# Patient Record
Sex: FEMALE | Race: WHITE | HISPANIC OR LATINO | Employment: FULL TIME | URBAN - METROPOLITAN AREA
[De-identification: names, ages, dates, MRNs, and addresses within clinical notes are randomized per-mention and may not be internally consistent; named-entity substitution may affect disease eponyms.]

---

## 2024-02-27 PROCEDURE — 87661 TRICHOMONAS VAGINALIS AMPLIF: CPT | Performed by: STUDENT IN AN ORGANIZED HEALTH CARE EDUCATION/TRAINING PROGRAM

## 2024-02-27 PROCEDURE — 87086 URINE CULTURE/COLONY COUNT: CPT | Performed by: STUDENT IN AN ORGANIZED HEALTH CARE EDUCATION/TRAINING PROGRAM

## 2024-02-27 PROCEDURE — 87491 CHLMYD TRACH DNA AMP PROBE: CPT | Performed by: STUDENT IN AN ORGANIZED HEALTH CARE EDUCATION/TRAINING PROGRAM

## 2024-03-19 DIAGNOSIS — O09.291: Primary | ICD-10-CM

## 2024-03-19 DIAGNOSIS — O09.291 HX OF PRE-ECLAMPSIA IN PRIOR PREGNANCY, CURRENTLY PREGNANT, FIRST TRIMESTER: ICD-10-CM

## 2024-03-21 ENCOUNTER — OFFICE VISIT (OUTPATIENT)
Dept: MATERNAL FETAL MEDICINE | Facility: CLINIC | Age: 29
End: 2024-03-21
Payer: COMMERCIAL

## 2024-03-21 ENCOUNTER — PROCEDURE VISIT (OUTPATIENT)
Dept: MATERNAL FETAL MEDICINE | Facility: CLINIC | Age: 29
End: 2024-03-21
Payer: COMMERCIAL

## 2024-03-21 VITALS
HEIGHT: 62 IN | WEIGHT: 246.5 LBS | SYSTOLIC BLOOD PRESSURE: 118 MMHG | HEART RATE: 78 BPM | BODY MASS INDEX: 45.36 KG/M2 | DIASTOLIC BLOOD PRESSURE: 58 MMHG

## 2024-03-21 DIAGNOSIS — E66.01 SEVERE OBESITY DUE TO EXCESS CALORIES AFFECTING PREGNANCY IN FIRST TRIMESTER: ICD-10-CM

## 2024-03-21 DIAGNOSIS — O10.919 HTN IN PREGNANCY, CHRONIC: Primary | ICD-10-CM

## 2024-03-21 DIAGNOSIS — E04.9 THYROID GOITER: ICD-10-CM

## 2024-03-21 DIAGNOSIS — O99.341 MENTAL DISORDER AFFECTING PREGNANCY IN FIRST TRIMESTER: ICD-10-CM

## 2024-03-21 DIAGNOSIS — O09.299 HISTORY OF ANENCEPHALY IN PRIOR PREGNANCY, CURRENTLY PREGNANT: ICD-10-CM

## 2024-03-21 DIAGNOSIS — O22.31 DEEP VEIN THROMBOSIS (DVT) AFFECTING PREGNANCY IN FIRST TRIMESTER: ICD-10-CM

## 2024-03-21 DIAGNOSIS — O99.211 SEVERE OBESITY DUE TO EXCESS CALORIES AFFECTING PREGNANCY IN FIRST TRIMESTER: ICD-10-CM

## 2024-03-21 DIAGNOSIS — O09.291 HX OF PRE-ECLAMPSIA IN PRIOR PREGNANCY, CURRENTLY PREGNANT, FIRST TRIMESTER: ICD-10-CM

## 2024-03-21 DIAGNOSIS — Z87.898 HISTORY OF PREDIABETES: ICD-10-CM

## 2024-03-21 DIAGNOSIS — O09.291: ICD-10-CM

## 2024-03-21 PROCEDURE — 3074F SYST BP LT 130 MM HG: CPT | Mod: CPTII,S$GLB,, | Performed by: OBSTETRICS & GYNECOLOGY

## 2024-03-21 PROCEDURE — 3044F HG A1C LEVEL LT 7.0%: CPT | Mod: CPTII,S$GLB,, | Performed by: OBSTETRICS & GYNECOLOGY

## 2024-03-21 PROCEDURE — 1160F RVW MEDS BY RX/DR IN RCRD: CPT | Mod: CPTII,S$GLB,, | Performed by: OBSTETRICS & GYNECOLOGY

## 2024-03-21 PROCEDURE — 76801 OB US < 14 WKS SINGLE FETUS: CPT | Mod: S$GLB,,, | Performed by: OBSTETRICS & GYNECOLOGY

## 2024-03-21 PROCEDURE — 3008F BODY MASS INDEX DOCD: CPT | Mod: CPTII,S$GLB,, | Performed by: OBSTETRICS & GYNECOLOGY

## 2024-03-21 PROCEDURE — 99205 OFFICE O/P NEW HI 60 MIN: CPT | Mod: S$GLB,,, | Performed by: OBSTETRICS & GYNECOLOGY

## 2024-03-21 PROCEDURE — 1159F MED LIST DOCD IN RCRD: CPT | Mod: CPTII,S$GLB,, | Performed by: OBSTETRICS & GYNECOLOGY

## 2024-03-21 PROCEDURE — 3078F DIAST BP <80 MM HG: CPT | Mod: CPTII,S$GLB,, | Performed by: OBSTETRICS & GYNECOLOGY

## 2024-03-21 RX ORDER — CHOLECALCIFEROL (VITAMIN D3) 50 MCG
TABLET ORAL DAILY
COMMUNITY

## 2024-03-21 RX ORDER — HYDROXYZINE PAMOATE 50 MG/1
50 CAPSULE ORAL EVERY 6 HOURS PRN
COMMUNITY
End: 2024-03-21

## 2024-03-21 RX ORDER — TRIAMCINOLONE ACETONIDE 0.25 MG/G
1 CREAM TOPICAL 3 TIMES DAILY PRN
COMMUNITY
Start: 2023-12-22

## 2024-03-21 RX ORDER — ALBUTEROL SULFATE 90 UG/1
2 AEROSOL, METERED RESPIRATORY (INHALATION) EVERY 6 HOURS PRN
COMMUNITY
Start: 2024-03-12

## 2024-03-21 RX ORDER — FLUOROMETHOLONE 1 MG/ML
1 SUSPENSION/ DROPS OPHTHALMIC 4 TIMES DAILY PRN
COMMUNITY
Start: 2024-03-03

## 2024-03-21 NOTE — ASSESSMENT & PLAN NOTE
There are  risks associated with obesity which include and increased risk of hypertension, preeclampsia, gestational diabetes, venous thromboembolic disease,  delivery, macrosomia (with resultant shoulder dystocia, obstetric sphincter injury), IUFD, longer first stage of labor, decreased TOLAC success rate, emergent & scheduled  & complications of  (prolonged OR time, delayed delivery, excessive EBL, infection, wound separation/infection, higher spinal failure rate, more difficult intubation).  Obesity is also associated with fetal anomalies, specifically neural tube defects.  Studies have shown that the rate of complication increases with rising BMI and thus pregnancies with maternal morbid obesity are at increased risk.      BMI 45    Recommendations:  TWG goal is 11-20 lbs  Screen for signs/symptoms of obstructive sleep apnea  Nutritionist consult offered (this is to be ordered by primary OB provider)  Consider early 1 hr GCT (between 14-16 weeks gestation); repeat at 24-28 weeks gestation  Start low dose aspirin daily at 12 weeks for preeclampsia risk reduction  Targeted anatomical survey scheduled at 18-20 weeks  Fetal growth ultrasound at 32 weeks if BMI >= 40  Weekly  testing at 32 weeks if pre-pregnancy BMI >= 45  Lovenox VTE prophylaxis while admitted to the hospital (antepartum or postpartum) if BMI >= 40.   Encouraged breastfeeding  Postpartum lifestyle modifications & weight loss

## 2024-03-21 NOTE — ASSESSMENT & PLAN NOTE
She reports a history of anxiety and depression. She is taking Zoloft 50mg daily and Hydroxyzine PRN. She reports improvement of symptoms with the utilization of this medication regimen. Discussed increased risk for peripartum and postpartum mood disorders. Precautions provided.      We discussed the usage of SSRIs in pregnancy and the minimal risks associated with the fetus.      We have discussed the importance of optimization of mental health conditions during pregnancy in an effort to reduce the risk of postpartum mood disorders. We have discussed options for management including psychotherapy, counseling, cognitive behavioral therapy, exercise/yoga, journaling, and psychiatry services. She will notify our office if she is interested in being referred for any of the above.

## 2024-03-21 NOTE — ASSESSMENT & PLAN NOTE
Today I counseled the patient on maternal/fetal risks associated with CHTN during pregnancy. Risks include but not limited to fetal growth restriction, miscarriage, abruption, maternal end organ disease (renal failure, MI, and stroke),  delivery, development of superimposed preeclampsia, and eclampsia. She was counseled on the recommendations for blood pressure control, serial ultrasound for fetal growth assessment and  testing, and timing of delivery. I also counseled her on the recommendation for aspirin 81 mg daily which may decrease her risk of developing superimposed preeclampsia.     Of note, she has a history of pre-eclampsia with her first pregnancy. She also has tachycardia. She is currently taking Acebutolol 200mg once daily (this regimen was started during her last pregnancy). She should continue this therapy without interruption.     Recommendations (Please refer to [facility] guidelines):  -Consider reducing aspirin dosing from 162mg to 81mg once daily for preeclampsia prevention since she is also taking Lovenox 60mg once daily  -Continue current medications: Acebutolol 200mg once daily  -Baseline evaluation with primary OB:   24-hour urine protein or baseline P/C ratio, CMP, and CBC (will request results from 24h urine completed at [facility])  Maternal EKG  Maternal ophthalmic evaluation  Maternal echocardiogram if HTN has been long-standing or EKG is abnormal  -Serial fetal growth ultrasounds every 4-6 weeks, beginning at 26-28 weeks.   -Continued close observation of patient's blood pressures. Avoid hypotension as this has been associated with uteroplacental insufficiency.  -In conjunction with the CHAP study recommendation for BP control: If BP is persistently ?140/90 antihypertensive medication is recommended with goal BP of 120-140/80-90.  -Weekly antepartum testing at 32 weeks (NST+AFV); twice weekly testing if control is poor, multiple comorbidities are present, or requires several  medications for control   -Delivery timing:  No medications, no comorbid conditions: 39 0/7 - 39 6/7 weeks gestation  No medications, comorbid conditions: 38 0/7 - 38 6/7 weeks gestation  Controlled on single agent, no comorbid conditions: 38 0/7 - 38 6/7 weeks gestation  Controlled on single agent, comorbid conditions: 37 0/7 - 37 6/7 weeks gestation  Uncontrolled or requiring ? 2 medications: 36 0/7 - 37 6/7 weeks gestation    Comorbid conditions include BMI >= 40, diabetes, and complex medical condition associated with placental dysfunction (ie lupus or other vascular disease)  Delivery may be recommended earlier pending results of fetal growth ultrasounds, AFV assessment, or antepartum testing results.

## 2024-03-21 NOTE — ASSESSMENT & PLAN NOTE
With her most recent pregnancy, her baby was diagnosed with anencephaly antenatally. She, then, developed polyhydramnios and had a subsequent amnioreduction for symptomatic relief at 32 weeks. Following the procedure, she PPROM'ed and had NRFHTs. She had an elective primary  in an effort to hold her baby while it was still alive prior to passing.    The risk of spina bifida/ neural tube defect after a previous sibling is affected is estimated to be about 4%. Cranial vault appears normal today on ultrasound and we will continue to reassess.    We recommend 4mg of folic acid daily.

## 2024-03-21 NOTE — ASSESSMENT & PLAN NOTE
She has a history of prediabetes. She has been taking Metformin 500 BID prior to pregnancy but has discontinued this regimen. Her most recent A1C was 5.1    There is an increased risk of diabetes in pregnancy given this history. We recommend early glucose screening with repeat testing at 24 weeks if passes.

## 2024-03-21 NOTE — PROGRESS NOTES
MATERNAL-FETAL MEDICINE   CONSULT NOTE    Provider requesting consultation: Dr Sandoval    SUBJECTIVE:     Ms. Rachel Pires is a 28 y.o.  female with IUP at 10w2d who is seen in consultation by MFM for evaluation and management of:  Problem   3. BMI affecting pregnancy in first trimester   4. History of anencephaly in prior pregnancy, currently pregnant   5. History of prediabetes   6. Anxiety/depression affecting pregnancy in first trimester   7. Thyroid goiter   1. HTN in pregnancy, chronic   2. h/o PE complicating pregnancy     Rachel is being referred for a history of preeclampsia with her 1st pregnancy and subsequent development of chronic hypertension/tachycardia afterward.  She was started on Acebutolol 200 mg once daily in her prior pregnancy in his continue this regimen.  Her primary OB started her on low-dose aspirin (162 mg) daily.  She has completed baseline hypertension labs.  With her most recent pregnancy, her baby had anencephaly diagnosed at time of anatomy scan.  She developed severe polyhydramnios at 32 weeks which required amnio reduction for symptomatic relief.  Shortly thereafter, she PPROM'ed and her baby developed nonreassuring fetal heart tones.  She elected for a primary  in an effort to hold her baby while he was still alive.  He passed after delivery.  She is taking a prescription prenatal vitamin currently with no additional folic acid supplementation.  One-week postpartum after her last delivery, she developed severe chest pain and was diagnosed with a pulmonary embolism.  She was on Eliquis for 6 months.  She reports having a echocardiogram completed approximately 2 months after the PE with her cardiologist with normal findings.  She elected to be evaluated by a  due to her baby having anencephaly.  Her  ordered a thrombophilia workup due to recent PE and a prothrombin gene mutation (heterozygous) was discovered.  She was, then, referred to a  hematologist where she has been followed routinely since then.  She was most recently evaluated about a month and a half ago and was started on Lovenox 60 mg daily to be continued throughout her pregnancy.  She states her hematologist is planning on increasing to therapeutic dosing in the postpartum period due to limited mobility.   BMI 45, History of prediabetes. On Metformin in the past; now discontinued. A1C 5.1 last week  History of anxiety and depression. Taking Zoloft 50mg daily and Vistaril PRN with adequate relief.  Hx thyroid goiter. She was on levothyroxine in the past. No longer taking this medication. She is following with an endocrinologist. TFTs evaluated last week with normal TSH. Thyroid antibodies negative.    Today we discussed genetic screening and she desires NIPT.           Medication List with Changes/Refills   Current Medications    ACEBUTOLOL (SECTRAL) 200 MG CAPSULE    Take 200 mg by mouth 2 (two) times daily.    ALBUTEROL (PROVENTIL/VENTOLIN HFA) 90 MCG/ACTUATION INHALER    Inhale 2 puffs into the lungs every 6 (six) hours as needed.    ASCORBIC ACID, VITAMIN C, (VITAMIN C) 1000 MG TABLET    Take 1,000 mg by mouth once daily.    ASPIRIN (ECOTRIN) 81 MG EC TABLET    Take 2 tablets (162 mg total) by mouth once daily. Start at 12 weeks gestation.  At 36 weeks gestation, decrease to one 81mg tablet daily.    BUSPIRONE (BUSPAR) 15 MG TABLET    Take 15 mg by mouth 3 (three) times daily.    CALCIUM CARBONATE-VITAMIN D3 600 MG-10 MCG (400 UNIT) CAP    Take 50 mcg by mouth.    CHOLECALCIFEROL, VITAMIN D3, (VITAMIN D3) 50 MCG (2,000 UNIT) TAB    Take by mouth once daily.    ENOXAPARIN (LOVENOX) 60 MG/0.6 ML SYRG        FAMOTIDINE (PEPCID) 20 MG TABLET    Take 1 tablet (20 mg total) by mouth 2 (two) times daily.    FERROUS SULFATE (FEOSOL) 325 MG (65 MG IRON) TAB TABLET    Take by mouth 3 (three) times daily.    FLUOROMETHOLONE 0.1% (FML) 0.1 % DRPS    Apply 1 drop to eye 4 (four) times daily as  needed.    HYDROXYZINE (ATARAX) 50 MG TABLET    Take 50 mg by mouth 4 (four) times daily.    PNV NO.143-IRON-METHYLFOLATE ORAL    Take by mouth.    SERTRALINE (ZOLOFT) 50 MG TABLET    Take 50 mg by mouth once daily.    TRIAMCINOLONE ACETONIDE 0.025% (KENALOG) 0.025 % CREAM    Apply 1 g topically 3 (three) times daily as needed.    ZINC GLUCONATE 50 MG TABLET    Take 50 mg by mouth once daily.   Discontinued Medications    APIXABAN (ELIQUIS) 5 MG TAB    Eliquis 5 mg tablet   TAKE 1 TABLET BY MOUTH TWICE A DAY    ESCITALOPRAM OXALATE (LEXAPRO) 5 MG TAB    escitalopram 5 mg tablet   TAKE 1 TABLET BY MOUTH EVERY DAY    HYDROXYZINE PAMOATE (VISTARIL) 50 MG CAP    Take 50 mg by mouth every 6 (six) hours as needed.    METFORMIN (GLUCOPHAGE) 500 MG TABLET    Take 500 mg by mouth 2 (two) times daily with meals.    PNV NO.95/FERROUS FUM/FOLIC AC (PRENATAL ORAL)    Take by mouth.       Review of patient's allergies indicates:   Allergen Reactions    Latex Hives and Itching       PMH:  Past Medical History:   Diagnosis Date    Anemia, unspecified     Anxiety and depression     Blood clotting disorder     factor 2    Diabetic acetonemia     pre-diabetic    High blood pressure     Insomnia     Other pulmonary embolism without acute cor pulmonale     Sleep apnea, unspecified     Supraventricular tachycardia        PObHx:  OB History    Para Term  AB Living   3 2 1 1   1   SAB IAB Ectopic Multiple Live Births           2      # Outcome Date GA Lbr Martin/2nd Weight Sex Delivery Anes PTL Lv   3 Current            2  22 32w0d  1.106 kg (2 lb 7 oz) M CS-Unspec  Y DEC      Birth Comments: neural tube defect,  shortly after birth; had poly and underwent amnioreduction then PPROM'ed and delivered      Complications: Anencephaly   1 Term 13 38w0d  3.005 kg (6 lb 10 oz) F Vag-Spont  N GISEL      Complications: Pre-eclampsia       PSH:  Past Surgical History:   Procedure Laterality Date     SECTION   "2022    TYMPANOSTOMY TUBE PLACEMENT      WISDOM TOOTH EXTRACTION         Family history:family history includes Breast cancer in her mother; Clotting disorder in her paternal aunt and paternal uncle; Diabetes in her paternal uncle; Hypertension in her father and mother; Skin cancer in her father; Stroke in her father.    Social history: reports that she quit smoking about 2 months ago. Her smoking use included cigarettes. She has been exposed to tobacco smoke. She has quit using smokeless tobacco. She reports that she does not currently use alcohol. She reports that she does not use drugs.    Genetic history: Prev pregnancy: anencephaly. The patient denies any inherited genetic diseases or birth defects in herself or her partner's personal history or family.    Objective:   BP (!) 118/58 (BP Location: Right arm)   Pulse 78   Ht 5' 2" (1.575 m)   Wt 111.8 kg (246 lb 7.6 oz)   LMP 2024 (Exact Date)   BMI 45.08 kg/m²     Ultrasound performed. See viewpoint for full ultrasound report.    A viable jimenez intrauterine pregnancy is visualized consistent with the given JAMISON.  Yolk sac is present. Early anatomic survey reveals no abnormalities. Placental location is not yet identified.    She will return for NIPT draw next Tuesday as the  will not be picking up today/tomorrow.    ASSESSMENT/PLAN:     28 y.o.  female with IUP at 10w2d     1. HTN in pregnancy, chronic  Today I counseled the patient on maternal/fetal risks associated with CHTN during pregnancy. Risks include but not limited to fetal growth restriction, miscarriage, abruption, maternal end organ disease (renal failure, MI, and stroke),  delivery, development of superimposed preeclampsia, and eclampsia. She was counseled on the recommendations for blood pressure control, serial ultrasound for fetal growth assessment and  testing, and timing of delivery. I also counseled her on the recommendation for aspirin 81 mg " daily which may decrease her risk of developing superimposed preeclampsia.     Of note, she has a history of pre-eclampsia with her first pregnancy. She also has tachycardia. She is currently taking Acebutolol 200mg once daily (this regimen was started during her last pregnancy). She should continue this therapy without interruption.     Recommendations (Please refer to [facility] guidelines):  -Consider reducing aspirin dosing from 162mg to 81mg once daily for preeclampsia prevention since she is also taking Lovenox 60mg once daily  -Continue current medications: Acebutolol 200mg once daily  -Baseline evaluation with primary OB:   24-hour urine protein or baseline P/C ratio, CMP, and CBC (will request results from 24h urine completed at [facility])  Maternal EKG  Maternal ophthalmic evaluation  Maternal echocardiogram if HTN has been long-standing or EKG is abnormal  -Serial fetal growth ultrasounds every 4-6 weeks, beginning at 26-28 weeks.   -Continued close observation of patient's blood pressures. Avoid hypotension as this has been associated with uteroplacental insufficiency.  -In conjunction with the CHAP study recommendation for BP control: If BP is persistently ?140/90 antihypertensive medication is recommended with goal BP of 120-140/80-90.  -Weekly antepartum testing at 32 weeks (NST+AFV); twice weekly testing if control is poor, multiple comorbidities are present, or requires several medications for control   -Delivery timing:  No medications, no comorbid conditions: 39 0/7 - 39 6/7 weeks gestation  No medications, comorbid conditions: 38 0/7 - 38 6/7 weeks gestation  Controlled on single agent, no comorbid conditions: 38 0/7 - 38 6/7 weeks gestation  Controlled on single agent, comorbid conditions: 37 0/7 - 37 6/7 weeks gestation  Uncontrolled or requiring ? 2 medications: 36 0/7 - 37 6/7 weeks gestation    Comorbid conditions include BMI >= 40, diabetes, and complex medical condition associated with  "placental dysfunction (ie lupus or other vascular disease)  Delivery may be recommended earlier pending results of fetal growth ultrasounds, AFV assessment, or antepartum testing results.        2. h/o PE complicating pregnancy  The patient has a history of a PE 1 wk postpartum after her most recent pregnancy in . She delivered  (anencephaly) at 32 weeks and developed a PE one week later. She was on Eliquis x 6 months. She requested evaluation with a  d/t anencephaly.  ordered thrombophilia workup due to recent PE and prothrombin gene mutation discovered. She was, then, referred to hematology.    She has been tested for inherited thrombophilia and antiphospholipid syndrome (APLS). The results of her testing are significant for prothrombin gene mutation (heterozygous). The patient was counseled on her history of VTE and the increased risk of VTE during pregnancy and the postpartum period.   She has been followed closely by her hematologist, Dr Morton at [facility]. She most recently had a visit on 24. She is currently taking Lovenox 60mg once daily. Recommendation "Consider increasing to therapeutic dosing during postpartum period if mobility is limited"    Recommendations given her history:   1) Anticoagulation Recs   a) Prophylactic anticoagulation during the antepartum period/ prophylactic anticoagulation for 6 weeks postpartum. Per hematology, "Consider increasing to therapeutic dosing during postpartum period if mobility is limited"  -For patients with a history of PE, consider maternal echocardiogram to assess for right heart strain  b) Anticoagulation selection/dosing/monitoring during pregnancy  -We recommend use of LMWH/enoxaparin over unfractionated heparin If the patient is unable to fill this medication, or if there is a high likelihood for need to reverse this medication, unfractionated heparin should be prescribed.  Prophylaxis dosing per hematology: Lovenox 60mg once " daily  c) Peripartum Management  -ASHELY hose/SCDs should be used while anticoagulation is interrupted.  - If on prophylactic dose of LMWH/UFH, the last dose should be 12 hours prior to neuraxial anesthesia. For this reason, we no longer recommend conversion to UFH at 36 weeks.  d) Postpartum Management  -If on prophylactic dose and has a vaginal delivery WITHOUT epidural, anticoagulation should not be initiated any sooner than 6 hours postpartum.  -If on prophylactic dose and delivers WITH epidural or spinal (regardless of delivery type), anticoagulation should not be initiated any sooner than 12 hours postpartum and must be at least 4 hours since epidural removal (if had epidural).  -For patients at exceedingly high risk of VTE, this may need to be modified in consultation with MFM.  -Breastfeeding is compatible with warfarin, UFH, and LMWH.      3. BMI affecting pregnancy in first trimester  There are  risks associated with obesity which include and increased risk of hypertension, preeclampsia, gestational diabetes, venous thromboembolic disease,  delivery, macrosomia (with resultant shoulder dystocia, obstetric sphincter injury), IUFD, longer first stage of labor, decreased TOLAC success rate, emergent & scheduled  & complications of  (prolonged OR time, delayed delivery, excessive EBL, infection, wound separation/infection, higher spinal failure rate, more difficult intubation).  Obesity is also associated with fetal anomalies, specifically neural tube defects.  Studies have shown that the rate of complication increases with rising BMI and thus pregnancies with maternal morbid obesity are at increased risk.      BMI 45    Recommendations:  TWG goal is 11-20 lbs  Screen for signs/symptoms of obstructive sleep apnea  Nutritionist consult offered (this is to be ordered by primary OB provider)  Consider early 1 hr GCT (between 14-16 weeks gestation); repeat at 24-28 weeks  gestation  Start low dose aspirin daily at 12 weeks for preeclampsia risk reduction  Targeted anatomical survey scheduled at 18-20 weeks  Fetal growth ultrasound at 32 weeks if BMI >= 40  Weekly  testing at 32 weeks if pre-pregnancy BMI >= 45  Lovenox VTE prophylaxis while admitted to the hospital (antepartum or postpartum) if BMI >= 40.   Encouraged breastfeeding  Postpartum lifestyle modifications & weight loss      4. History of anencephaly in prior pregnancy, currently pregnant  With her most recent pregnancy, her baby was diagnosed with anencephaly antenatally. She, then, developed polyhydramnios and had a subsequent amnioreduction for symptomatic relief at 32 weeks. Following the procedure, she PPROM'ed and had NRFHTs. She had an elective primary  in an effort to hold her baby while it was still alive prior to passing.    The risk of spina bifida/ neural tube defect after a previous sibling is affected is estimated to be about 4%. Cranial vault appears normal today on ultrasound and we will continue to reassess.    We recommend 4mg of folic acid daily.    5. History of prediabetes  She has a history of prediabetes. She has been taking Metformin 500 BID prior to pregnancy but has discontinued this regimen. Her most recent A1C was 5.1    There is an increased risk of diabetes in pregnancy given this history. We recommend early glucose screening with repeat testing at 24 weeks if passes.    6. Anxiety/depression affecting pregnancy in first trimester  She reports a history of anxiety and depression. She is taking Zoloft 50mg daily and Hydroxyzine PRN. She reports improvement of symptoms with the utilization of this medication regimen. Discussed increased risk for peripartum and postpartum mood disorders. Precautions provided.      We discussed the usage of SSRIs in pregnancy and the minimal risks associated with the fetus.      We have discussed the importance of optimization of mental health  conditions during pregnancy in an effort to reduce the risk of postpartum mood disorders. We have discussed options for management including psychotherapy, counseling, cognitive behavioral therapy, exercise/yoga, journaling, and psychiatry services. She will notify our office if she is interested in being referred for any of the above.      7. Thyroid goiter  She has a history of thyroid goiter. She was on levothyroxine in the past. However, she is no longer taking this medication. Her endocrinologist, Dr Hilton, follows TFTs. Most recently checked on 3/14/24 TSH 1.045. Thyroid antibody testing negative. She is without complaints.  Recommend restarting Levothyroxine IF TSH >2.5 during pregnancy.      FOLLOW UP:   F/u in 4 weeks for US/MFM visit    This consultation was completed with the assistance of Humaira Arceo NP.        Karyna Veloz MD  Maternal Fetal Medicine

## 2024-03-21 NOTE — ASSESSMENT & PLAN NOTE
She has a history of thyroid goiter. She was on levothyroxine in the past. However, she is no longer taking this medication. Her endocrinologist, Dr Hilton, follows TFTs. Most recently checked on 3/14/24 TSH 1.045. Thyroid antibody testing negative. She is without complaints.  Recommend restarting Levothyroxine IF TSH >2.5 during pregnancy.

## 2024-03-21 NOTE — ASSESSMENT & PLAN NOTE
"The patient has a history of a PE 1 wk postpartum after her most recent pregnancy in . She delivered  (anencephaly) at 32 weeks and developed a PE one week later. She was on Eliquis x 6 months. She requested evaluation with a  d/t anencephaly.  ordered thrombophilia workup due to recent PE and prothrombin gene mutation discovered. She was, then, referred to hematology.    She has been tested for inherited thrombophilia and antiphospholipid syndrome (APLS). The results of her testing are significant for prothrombin gene mutation (heterozygous). The patient was counseled on her history of VTE and the increased risk of VTE during pregnancy and the postpartum period.   She has been followed closely by her hematologist, Dr Morton at [facility]. She most recently had a visit on 24. She is currently taking Lovenox 60mg once daily. Recommendation "Consider increasing to therapeutic dosing during postpartum period if mobility is limited"    Recommendations given her history:   1) Anticoagulation Recs   a) Prophylactic anticoagulation during the antepartum period/ prophylactic anticoagulation for 6 weeks postpartum. Per hematology, "Consider increasing to therapeutic dosing during postpartum period if mobility is limited"  -For patients with a history of PE, consider maternal echocardiogram to assess for right heart strain  b) Anticoagulation selection/dosing/monitoring during pregnancy  -We recommend use of LMWH/enoxaparin over unfractionated heparin If the patient is unable to fill this medication, or if there is a high likelihood for need to reverse this medication, unfractionated heparin should be prescribed.  Prophylaxis dosing per hematology: Lovenox 60mg once daily  c) Peripartum Management  -ASHELY hose/SCDs should be used while anticoagulation is interrupted.  - If on prophylactic dose of LMWH/UFH, the last dose should be 12 hours prior to neuraxial anesthesia. For this reason, we no " longer recommend conversion to UFH at 36 weeks.  d) Postpartum Management  -If on prophylactic dose and has a vaginal delivery WITHOUT epidural, anticoagulation should not be initiated any sooner than 6 hours postpartum.  -If on prophylactic dose and delivers WITH epidural or spinal (regardless of delivery type), anticoagulation should not be initiated any sooner than 12 hours postpartum and must be at least 4 hours since epidural removal (if had epidural).  -For patients at exceedingly high risk of VTE, this may need to be modified in consultation with MFM.  -Breastfeeding is compatible with warfarin, UFH, and LMWH.

## 2024-04-04 ENCOUNTER — PATIENT MESSAGE (OUTPATIENT)
Dept: MATERNAL FETAL MEDICINE | Facility: CLINIC | Age: 29
End: 2024-04-04
Payer: COMMERCIAL

## 2024-04-15 PROCEDURE — 87086 URINE CULTURE/COLONY COUNT: CPT | Performed by: STUDENT IN AN ORGANIZED HEALTH CARE EDUCATION/TRAINING PROGRAM

## 2024-04-18 ENCOUNTER — PROCEDURE VISIT (OUTPATIENT)
Dept: MATERNAL FETAL MEDICINE | Facility: CLINIC | Age: 29
End: 2024-04-18
Payer: COMMERCIAL

## 2024-04-18 ENCOUNTER — OFFICE VISIT (OUTPATIENT)
Dept: MATERNAL FETAL MEDICINE | Facility: CLINIC | Age: 29
End: 2024-04-18
Payer: COMMERCIAL

## 2024-04-18 VITALS
HEART RATE: 83 BPM | DIASTOLIC BLOOD PRESSURE: 63 MMHG | SYSTOLIC BLOOD PRESSURE: 126 MMHG | BODY MASS INDEX: 44.41 KG/M2 | WEIGHT: 241.31 LBS | HEIGHT: 62 IN

## 2024-04-18 DIAGNOSIS — O99.342 MENTAL DISORDER AFFECTING PREGNANCY IN SECOND TRIMESTER: ICD-10-CM

## 2024-04-18 DIAGNOSIS — O22.31 DEEP VEIN THROMBOSIS (DVT) AFFECTING PREGNANCY IN FIRST TRIMESTER: ICD-10-CM

## 2024-04-18 DIAGNOSIS — O10.919 HTN IN PREGNANCY, CHRONIC: ICD-10-CM

## 2024-04-18 DIAGNOSIS — E66.01 SEVERE OBESITY DUE TO EXCESS CALORIES AFFECTING PREGNANCY IN SECOND TRIMESTER: ICD-10-CM

## 2024-04-18 DIAGNOSIS — O24.415 GESTATIONAL DIABETES MELLITUS (GDM) IN SECOND TRIMESTER CONTROLLED ON ORAL HYPOGLYCEMIC DRUG: ICD-10-CM

## 2024-04-18 DIAGNOSIS — O99.212 SEVERE OBESITY DUE TO EXCESS CALORIES AFFECTING PREGNANCY IN SECOND TRIMESTER: ICD-10-CM

## 2024-04-18 DIAGNOSIS — O22.32 DEEP VEIN THROMBOSIS (DVT) AFFECTING PREGNANCY IN SECOND TRIMESTER: ICD-10-CM

## 2024-04-18 DIAGNOSIS — E04.9 THYROID GOITER: ICD-10-CM

## 2024-04-18 DIAGNOSIS — O10.919 HTN IN PREGNANCY, CHRONIC: Primary | ICD-10-CM

## 2024-04-18 DIAGNOSIS — E66.01 SEVERE OBESITY DUE TO EXCESS CALORIES AFFECTING PREGNANCY IN FIRST TRIMESTER: ICD-10-CM

## 2024-04-18 DIAGNOSIS — O99.211 SEVERE OBESITY DUE TO EXCESS CALORIES AFFECTING PREGNANCY IN FIRST TRIMESTER: ICD-10-CM

## 2024-04-18 DIAGNOSIS — O09.299 HISTORY OF ANENCEPHALY IN PRIOR PREGNANCY, CURRENTLY PREGNANT: ICD-10-CM

## 2024-04-18 PROCEDURE — 99214 OFFICE O/P EST MOD 30 MIN: CPT | Mod: S$GLB,,, | Performed by: OBSTETRICS & GYNECOLOGY

## 2024-04-18 PROCEDURE — 1160F RVW MEDS BY RX/DR IN RCRD: CPT | Mod: CPTII,S$GLB,, | Performed by: OBSTETRICS & GYNECOLOGY

## 2024-04-18 PROCEDURE — 3078F DIAST BP <80 MM HG: CPT | Mod: CPTII,S$GLB,, | Performed by: OBSTETRICS & GYNECOLOGY

## 2024-04-18 PROCEDURE — 76805 OB US >/= 14 WKS SNGL FETUS: CPT | Mod: S$GLB,,, | Performed by: OBSTETRICS & GYNECOLOGY

## 2024-04-18 PROCEDURE — 3074F SYST BP LT 130 MM HG: CPT | Mod: CPTII,S$GLB,, | Performed by: OBSTETRICS & GYNECOLOGY

## 2024-04-18 PROCEDURE — 1159F MED LIST DOCD IN RCRD: CPT | Mod: CPTII,S$GLB,, | Performed by: OBSTETRICS & GYNECOLOGY

## 2024-04-18 PROCEDURE — 3044F HG A1C LEVEL LT 7.0%: CPT | Mod: CPTII,S$GLB,, | Performed by: OBSTETRICS & GYNECOLOGY

## 2024-04-18 PROCEDURE — 3008F BODY MASS INDEX DOCD: CPT | Mod: CPTII,S$GLB,, | Performed by: OBSTETRICS & GYNECOLOGY

## 2024-04-18 RX ORDER — LANCETS 33 GAUGE
EACH MISCELLANEOUS EVERY MORNING
COMMUNITY
Start: 2024-04-05

## 2024-04-18 NOTE — ASSESSMENT & PLAN NOTE
She has a history of prediabetes. She has been taking Metformin 500 BID prior to pregnancy but has discontinued this regimen. Her most recent A1C was 5.1  There is an increased risk of diabetes in pregnancy given this history. She opted out of early 1h glucola screening and started checking her sugars which demonstrated elevated postprandial values. Counseling is as follows:    The patient was counseled regarding the risks of diabetes in pregnancy. These risks include but are not limited to an increased risk of , preeclampsia/gestational hypertension, fetal structural anomalies, macrosomia, prematurity, shoulder dystocia/birth injury,  hyperbilirubinemia and electrolyte issues, pulmonary immaturity and sudden stillbirth especially in those patients with poor glucose control. I also discussed with the patient the need for increased fetal surveillance with serial fetal growth assessments and third trimester fetal testing. I also reviewed the possibility of need for early delivery in those with poor glucose control or evidence of fetal growth abnormalities.    She presents with a sugar log today which demonstrates elevated postprandial values. We have discussed restarting Metformin 500mg QAM. (She will attempt extended release formula to prevent GI upset that she's experienced in the past)    Recommendations:  Baseline evaluation (to be ordered by primary OB provider):  24 hour urine protein or urine P/C ratio, CBC, CMP  Maternal EKG; echocardiogram if BMI > 30 or EKG is abnormal  Maternal ophthalmic exam (if hasn't been performed in last year) and podiatry exam  Hemoglobin A1C every trimester  Diabetic education referral and counseling re: goal blood sugars (< 95 mg/dl for fasting, < 120 mg/dl for 2 hour postprandial)  Medications:   low dose aspirin 81 mg daily for preeclampsia risk reduction  1 mg folic acid daily  Regimen: Metformin 500mg QAM  She will submit her log to our office on a weekly basis  via email or portal for review and management   Ultrasounds with [facility]:  Nuchal translucency scan at 12-13 weeks  Targeted anatomy survey at 20 weeks (scheduled with [facility])  Serial growth ultrasounds q 4-6 weeks at 26-28 weeks (scheduled with [facility])    A fetal echocardiogram is recommended at 22-24 weeks with pediatric cardiology ([facility] will arrange)    Initiate  surveillance at 32 weeks:   Weekly BPP or NST + AFV if good control.   If control is poor, twice weekly  fetal surveillance is recommended with BPP alternating with NST   Delivery timin 0/7 to 38 and 6/7 weeks if under good control without comorbidities  37 0/7 to 37 and 67 weeks if longstanding diabetes or poorly controlled, polyhydramnios, EFW>90th percentile, or BMI >= 40  36 0/7 to 37 and 6/7 weeks if vascular complications, prior stillbirth or other complicating conditions.  Recommend consideration of earlier delivery if IUGR, HTN, or other complications  Recommend offering  for delivery is EFW is 4500g or more near the time of delivery    Postpartum management  -Insulin should be reduced by 1/2 of the pre-delivery dose within the first 6-24 hours following delivery.  -Patients who were well-controlled on oral regimens can often return to these following delivery.  -Patients who are breastfeeding should be advised to have small snacks before breastfeeding to reduce the risk of hypoglycemia.  -Patients should be referred to primary MD or Endocrinology for postpartum management.    The patient was advised to call for blood sugars less than 70 or greater than 200 prior to her next appointment. She was also advised that if she notes nausea/vomiting, inability to tolerate PO, or concerns about being able to take her insulin that she should contact her provider or present to the OB ED.

## 2024-04-18 NOTE — ASSESSMENT & PLAN NOTE
"The patient has a history of a PE 1 wk postpartum after her most recent pregnancy in . She delivered  (anencephaly) at 32 weeks and developed a PE one week later. She was on Eliquis x 6 months. She requested evaluation with a  d/t anencephaly.  ordered thrombophilia workup due to recent PE and prothrombin gene mutation discovered. She was, then, referred to hematology.    She has been tested for inherited thrombophilia and antiphospholipid syndrome (APLS). The results of her testing are significant for prothrombin gene mutation (heterozygous). The patient was counseled on her history of VTE and the increased risk of VTE during pregnancy and the postpartum period.   She has been followed closely by her hematologist, Dr Morton at [facility]. She most recently had a visit on 24. She is currently taking Lovenox 60mg once daily. Recommendation "Consider increasing to therapeutic dosing during postpartum period if mobility is limited"    Next hematology appt 5/3/24    Recommendations given her history:   1) Anticoagulation Recs   a) Prophylactic anticoagulation during the antepartum period/ prophylactic anticoagulation for 6 weeks postpartum. Per hematology, "Consider increasing to therapeutic dosing during postpartum period if mobility is limited"  -For patients with a history of PE, consider maternal echocardiogram to assess for right heart strain  b) Anticoagulation selection/dosing/monitoring during pregnancy  -We recommend use of LMWH/enoxaparin over unfractionated heparin If the patient is unable to fill this medication, or if there is a high likelihood for need to reverse this medication, unfractionated heparin should be prescribed.  Prophylaxis dosing per hematology: Lovenox 60mg once daily  c) Peripartum Management  -ASHELY hose/SCDs should be used while anticoagulation is interrupted.  - If on prophylactic dose of LMWH/UFH, the last dose should be 12 hours prior to neuraxial " anesthesia. For this reason, we no longer recommend conversion to UFH at 36 weeks.  d) Postpartum Management  -If on prophylactic dose and has a vaginal delivery WITHOUT epidural, anticoagulation should not be initiated any sooner than 6 hours postpartum.  -If on prophylactic dose and delivers WITH epidural or spinal (regardless of delivery type), anticoagulation should not be initiated any sooner than 12 hours postpartum and must be at least 4 hours since epidural removal (if had epidural).  -For patients at exceedingly high risk of VTE, this may need to be modified in consultation with MFM.  -Breastfeeding is compatible with warfarin, UFH, and LMWH.

## 2024-04-18 NOTE — PROGRESS NOTES
"Maternal Fetal Medicine follow up consult    SUBJECTIVE:     Rachel Pires is a 28 y.o.  female with IUP at 14w2d who is seen in follow up consultation by MFM.  Pregnancy complications include:   Problem   - BMI affecting pregnancy in second trimester   - History of anencephaly in prior pregnancy, currently pregnant   - Diabetes in second trimester controlled on oral hypoglycemic drug   - Anxiety/depression affecting pregnancy in second trimester   - Thyroid goiter   - HTN in pregnancy, chronic   - h/o PE complicating pregnancy     Rachel presents for routine follow up appointment.  She opted out of early 1h glucose screening and started checking her sugars. She has a sugar log for review. She is not currently taking Metformin.  Have not received baseline 24h urine results from [facility]. Will attempt again.  Next hematology appt: 5/3/24  Denies LOF, VB, contractions. Positive fetal movement.    Previous notes reviewed.   No changes to medical, surgical, family, social, or obstetric history.    Interval history since last MFM visit: see above    Medications reviewed.    Care team members:  Dr Sandoval - Primary OB     OBJECTIVE:   /63 (BP Location: Right arm)   Pulse 83   Ht 5' 2" (1.575 m)   Wt 109.5 kg (241 lb 4.7 oz)   LMP 2024 (Exact Date)   BMI 44.13 kg/m²     Ultrasound performed. See viewpoint for full ultrasound report.    A jimenez living IUP is identified, and the biometry is appropriate for established gestational age.    Early, limited anatomy appears normal. The placenta is anterior.              ASSESSMENT/PLAN:     28 y.o.  female with IUP at 14w2d    - HTN in pregnancy, chronic  Previously counseled the patient on maternal/fetal risks associated with CHTN during pregnancy. Risks include but not limited to fetal growth restriction, miscarriage, abruption, maternal end organ disease (renal failure, MI, and stroke),  delivery, development of superimposed " preeclampsia, and eclampsia.    Of note, she has a history of pre-eclampsia with her first pregnancy. She also has tachycardia. She is currently taking Acebutolol 200mg once daily (this regimen was started during her last pregnancy). She should continue this therapy without interruption.     Recommendations (Please refer to [facility] guidelines):  -Reduced aspirin dosing from 162mg to 81mg once daily for preeclampsia prevention since she is also taking Lovenox 60mg once daily  -Continue current medications: Acebutolol 200mg once daily  -Baseline evaluation with primary OB:   24-hour urine protein or baseline P/C ratio, CMP, and CBC (will request results from 24h urine completed at [facility])  Maternal EKG  Maternal ophthalmic evaluation  Maternal echocardiogram if HTN has been long-standing or EKG is abnormal  -Serial fetal growth ultrasounds every 4-6 weeks, beginning at 26-28 weeks.   -Continued close observation of patient's blood pressures. Avoid hypotension as this has been associated with uteroplacental insufficiency.  -In conjunction with the CHAP study recommendation for BP control: If BP is persistently ?140/90 antihypertensive medication is recommended with goal BP of 120-140/80-90.  -Weekly antepartum testing at 32 weeks (NST+AFV); twice weekly testing if control is poor, multiple comorbidities are present, or requires several medications for control   -Delivery timing:  No medications, no comorbid conditions: 39 0/7 - 39 6/7 weeks gestation  No medications, comorbid conditions: 38 0/7 - 38 6/7 weeks gestation  Controlled on single agent, no comorbid conditions: 38 0/7 - 38 6/7 weeks gestation  Controlled on single agent, comorbid conditions: 37 0/7 - 37 6/7 weeks gestation  Uncontrolled or requiring ? 2 medications: 36 0/7 - 37 6/7 weeks gestation    Comorbid conditions include BMI >= 40, diabetes, and complex medical condition associated with placental dysfunction (ie lupus or other vascular  disease)  Delivery may be recommended earlier pending results of fetal growth ultrasounds, AFV assessment, or antepartum testing results.        - Diabetes in second trimester controlled on oral hypoglycemic drug  She has a history of prediabetes. She has been taking Metformin 500 BID prior to pregnancy but has discontinued this regimen. Her most recent A1C was 5.1  There is an increased risk of diabetes in pregnancy given this history. She opted out of early 1h glucola screening and started checking her sugars which demonstrated elevated postprandial values. Counseling is as follows:    The patient was counseled regarding the risks of diabetes in pregnancy. These risks include but are not limited to an increased risk of , preeclampsia/gestational hypertension, fetal structural anomalies, macrosomia, prematurity, shoulder dystocia/birth injury,  hyperbilirubinemia and electrolyte issues, pulmonary immaturity and sudden stillbirth especially in those patients with poor glucose control. I also discussed with the patient the need for increased fetal surveillance with serial fetal growth assessments and third trimester fetal testing. I also reviewed the possibility of need for early delivery in those with poor glucose control or evidence of fetal growth abnormalities.    She presents with a sugar log today which demonstrates elevated postprandial values. We have discussed restarting Metformin 500mg QAM. (She will attempt extended release formula to prevent GI upset that she's experienced in the past)    Recommendations:  Baseline evaluation (to be ordered by primary OB provider):  24 hour urine protein or urine P/C ratio, CBC, CMP  Maternal EKG; echocardiogram if BMI > 30 or EKG is abnormal  Maternal ophthalmic exam (if hasn't been performed in last year) and podiatry exam  Hemoglobin A1C every trimester  Diabetic education referral and counseling re: goal blood sugars (< 95 mg/dl for fasting, < 120 mg/dl  for 2 hour postprandial)  Medications:   low dose aspirin 81 mg daily for preeclampsia risk reduction  1 mg folic acid daily  Regimen: Metformin 500mg QAM  She will submit her log to our office on a weekly basis via email or portal for review and management   Ultrasounds with [facility]:  Nuchal translucency scan at 12-13 weeks  Targeted anatomy survey at 20 weeks (scheduled with [facility])  Serial growth ultrasounds q 4-6 weeks at 26-28 weeks (scheduled with [facility])    A fetal echocardiogram is recommended at 22-24 weeks with pediatric cardiology ([facility] will arrange)    Initiate  surveillance at 32 weeks:   Weekly BPP or NST + AFV if good control.   If control is poor, twice weekly  fetal surveillance is recommended with BPP alternating with NST   Delivery timin 0/7 to 38 and 6/7 weeks if under good control without comorbidities  37 0/7 to 37 and 67 weeks if longstanding diabetes or poorly controlled, polyhydramnios, EFW>90th percentile, or BMI >= 40  36 0/7 to 37 and 6/7 weeks if vascular complications, prior stillbirth or other complicating conditions.  Recommend consideration of earlier delivery if IUGR, HTN, or other complications  Recommend offering  for delivery is EFW is 4500g or more near the time of delivery    Postpartum management  -Insulin should be reduced by 1/2 of the pre-delivery dose within the first 6-24 hours following delivery.  -Patients who were well-controlled on oral regimens can often return to these following delivery.  -Patients who are breastfeeding should be advised to have small snacks before breastfeeding to reduce the risk of hypoglycemia.  -Patients should be referred to primary MD or Endocrinology for postpartum management.    The patient was advised to call for blood sugars less than 70 or greater than 200 prior to her next appointment. She was also advised that if she notes nausea/vomiting, inability to tolerate PO, or concerns about being able to take her insulin  "that she should contact her provider or present to the OB ED.      - Anxiety/depression affecting pregnancy in second trimester  She reports a history of anxiety and depression. She is taking Zoloft 50mg daily and Hydroxyzine PRN. She reports improvement of symptoms with the utilization of this medication regimen. Discussed increased risk for peripartum and postpartum mood disorders. Precautions provided.      We discussed the usage of SSRIs in pregnancy and the minimal risks associated with the fetus.      We have discussed the importance of optimization of mental health conditions during pregnancy in an effort to reduce the risk of postpartum mood disorders. We have discussed options for management including psychotherapy, counseling, cognitive behavioral therapy, exercise/yoga, journaling, and psychiatry services. She will notify our office if she is interested in being referred for any of the above.      - h/o PE complicating pregnancy  The patient has a history of a PE 1 wk postpartum after her most recent pregnancy in . She delivered  (anencephaly) at 32 weeks and developed a PE one week later. She was on Eliquis x 6 months. She requested evaluation with a  d/t anencephaly.  ordered thrombophilia workup due to recent PE and prothrombin gene mutation discovered. She was, then, referred to hematology.    She has been tested for inherited thrombophilia and antiphospholipid syndrome (APLS). The results of her testing are significant for prothrombin gene mutation (heterozygous). The patient was counseled on her history of VTE and the increased risk of VTE during pregnancy and the postpartum period.   She has been followed closely by her hematologist, Dr Morton at [facility]. She most recently had a visit on 24. She is currently taking Lovenox 60mg once daily. Recommendation "Consider increasing to therapeutic dosing during postpartum period if mobility is limited"    Next " "hematology appt 5/3/24    Recommendations given her history:   1) Anticoagulation Recs   a) Prophylactic anticoagulation during the antepartum period/ prophylactic anticoagulation for 6 weeks postpartum. Per hematology, "Consider increasing to therapeutic dosing during postpartum period if mobility is limited"  -For patients with a history of PE, consider maternal echocardiogram to assess for right heart strain  b) Anticoagulation selection/dosing/monitoring during pregnancy  -We recommend use of LMWH/enoxaparin over unfractionated heparin If the patient is unable to fill this medication, or if there is a high likelihood for need to reverse this medication, unfractionated heparin should be prescribed.  Prophylaxis dosing per hematology: Lovenox 60mg once daily  c) Peripartum Management  -ASHELY hose/SCDs should be used while anticoagulation is interrupted.  - If on prophylactic dose of LMWH/UFH, the last dose should be 12 hours prior to neuraxial anesthesia. For this reason, we no longer recommend conversion to UFH at 36 weeks.  d) Postpartum Management  -If on prophylactic dose and has a vaginal delivery WITHOUT epidural, anticoagulation should not be initiated any sooner than 6 hours postpartum.  -If on prophylactic dose and delivers WITH epidural or spinal (regardless of delivery type), anticoagulation should not be initiated any sooner than 12 hours postpartum and must be at least 4 hours since epidural removal (if had epidural).  -For patients at exceedingly high risk of VTE, this may need to be modified in consultation with MFM.  -Breastfeeding is compatible with warfarin, UFH, and LMWH.      - Thyroid goiter  She has a history of thyroid goiter. She was on levothyroxine in the past. However, she is no longer taking this medication. Her endocrinologist, Dr Hilton, follows TFTs. Most recently checked on 3/14/24 TSH 1.045. Thyroid antibody testing negative. She is without complaints.  Recommend restarting " Levothyroxine IF TSH >2.5 during pregnancy.    - BMI affecting pregnancy in second trimester  Please see prior documentation for specific counseling.     BMI 45    Recommendations:  TWG goal is 11-20 lbs  Screen for signs/symptoms of obstructive sleep apnea  Nutritionist consult offered (this is to be ordered by primary OB provider)  Consider early 1 hr GCT (between 14-16 weeks gestation); repeat at 24-28 weeks gestation  Start low dose aspirin daily at 12 weeks for preeclampsia risk reduction  Targeted anatomical survey scheduled at 18-20 weeks  Fetal growth ultrasound at 32 weeks if BMI >= 40  Weekly  testing at 32 weeks if pre-pregnancy BMI >= 45  Lovenox VTE prophylaxis while admitted to the hospital (antepartum or postpartum) if BMI >= 40.   Encouraged breastfeeding  Postpartum lifestyle modifications & weight loss      - History of anencephaly in prior pregnancy, currently pregnant  With her most recent pregnancy, her baby was diagnosed with anencephaly antenatally. She, then, developed polyhydramnios and had a subsequent amnioreduction for symptomatic relief at 32 weeks. Following the procedure, she PPROM'ed and had NRFHTs. She had an elective primary  in an effort to hold her baby while it was still alive prior to passing.    The risk of spina bifida/ neural tube defect after a previous sibling is affected is estimated to be about 4%. Cranial vault appears normal today on ultrasound and we will continue to reassess.    We recommend 4mg of folic acid daily.    F/u in 6 weeks for MFM visit /growth ultrasound    Karyna Veloz MD  Maternal Fetal Medicine

## 2024-04-18 NOTE — ASSESSMENT & PLAN NOTE
Please see prior documentation for specific counseling.     BMI 45    Recommendations:  TWG goal is 11-20 lbs  Screen for signs/symptoms of obstructive sleep apnea  Nutritionist consult offered (this is to be ordered by primary OB provider)  Consider early 1 hr GCT (between 14-16 weeks gestation); repeat at 24-28 weeks gestation  Start low dose aspirin daily at 12 weeks for preeclampsia risk reduction  Targeted anatomical survey scheduled at 18-20 weeks  Fetal growth ultrasound at 32 weeks if BMI >= 40  Weekly  testing at 32 weeks if pre-pregnancy BMI >= 45  Lovenox VTE prophylaxis while admitted to the hospital (antepartum or postpartum) if BMI >= 40.   Encouraged breastfeeding  Postpartum lifestyle modifications & weight loss

## 2024-04-18 NOTE — ASSESSMENT & PLAN NOTE
Previously counseled the patient on maternal/fetal risks associated with CHTN during pregnancy. Risks include but not limited to fetal growth restriction, miscarriage, abruption, maternal end organ disease (renal failure, MI, and stroke),  delivery, development of superimposed preeclampsia, and eclampsia.    Of note, she has a history of pre-eclampsia with her first pregnancy. She also has tachycardia. She is currently taking Acebutolol 200mg once daily (this regimen was started during her last pregnancy). She should continue this therapy without interruption.     Recommendations (Please refer to [facility] guidelines):  -Reduced aspirin dosing from 162mg to 81mg once daily for preeclampsia prevention since she is also taking Lovenox 60mg once daily  -Continue current medications: Acebutolol 200mg once daily  -Baseline evaluation with primary OB:   24-hour urine protein or baseline P/C ratio, CMP, and CBC (will request results from 24h urine completed at [facility])  Maternal EKG  Maternal ophthalmic evaluation  Maternal echocardiogram if HTN has been long-standing or EKG is abnormal  -Serial fetal growth ultrasounds every 4-6 weeks, beginning at 26-28 weeks.   -Continued close observation of patient's blood pressures. Avoid hypotension as this has been associated with uteroplacental insufficiency.  -In conjunction with the CHAP study recommendation for BP control: If BP is persistently ?140/90 antihypertensive medication is recommended with goal BP of 120-140/80-90.  -Weekly antepartum testing at 32 weeks (NST+AFV); twice weekly testing if control is poor, multiple comorbidities are present, or requires several medications for control   -Delivery timing:  No medications, no comorbid conditions: 39 0/7 - 39 6/7 weeks gestation  No medications, comorbid conditions: 38 0/7 - 38 6/7 weeks gestation  Controlled on single agent, no comorbid conditions: 38 0/7 - 38 6/7 weeks gestation  Controlled on single agent,  comorbid conditions: 37 0/7 - 37 6/7 weeks gestation  Uncontrolled or requiring ? 2 medications: 36 0/7 - 37 6/7 weeks gestation    Comorbid conditions include BMI >= 40, diabetes, and complex medical condition associated with placental dysfunction (ie lupus or other vascular disease)  Delivery may be recommended earlier pending results of fetal growth ultrasounds, AFV assessment, or antepartum testing results.

## 2024-04-29 ENCOUNTER — PATIENT MESSAGE (OUTPATIENT)
Dept: MATERNAL FETAL MEDICINE | Facility: CLINIC | Age: 29
End: 2024-04-29
Payer: COMMERCIAL

## 2024-04-29 NOTE — TELEPHONE ENCOUNTER
Per Humaira and Dr. Veloz increase Metformin from 500mg qam to 750mg qam.    I called pt informed pt of rec. Pt reports she has been taking the Metformin qhs, she reports she must have misunderstood the new time change, I informed Humaira of this, she reports have pt only do 500mg qam, send another log in 1wk and we will evaluate then. Pt verbalized understanding.

## 2024-05-08 ENCOUNTER — PATIENT MESSAGE (OUTPATIENT)
Dept: MATERNAL FETAL MEDICINE | Facility: CLINIC | Age: 29
End: 2024-05-08
Payer: COMMERCIAL

## 2024-05-21 ENCOUNTER — PATIENT MESSAGE (OUTPATIENT)
Dept: MATERNAL FETAL MEDICINE | Facility: CLINIC | Age: 29
End: 2024-05-21
Payer: COMMERCIAL

## 2024-05-30 ENCOUNTER — OFFICE VISIT (OUTPATIENT)
Dept: MATERNAL FETAL MEDICINE | Facility: CLINIC | Age: 29
End: 2024-05-30
Payer: COMMERCIAL

## 2024-05-30 ENCOUNTER — PROCEDURE VISIT (OUTPATIENT)
Dept: MATERNAL FETAL MEDICINE | Facility: CLINIC | Age: 29
End: 2024-05-30
Payer: COMMERCIAL

## 2024-05-30 VITALS
SYSTOLIC BLOOD PRESSURE: 126 MMHG | DIASTOLIC BLOOD PRESSURE: 82 MMHG | BODY MASS INDEX: 43.41 KG/M2 | WEIGHT: 235.88 LBS | HEIGHT: 62 IN | HEART RATE: 80 BPM

## 2024-05-30 DIAGNOSIS — O09.299 HISTORY OF ANENCEPHALY IN PRIOR PREGNANCY, CURRENTLY PREGNANT: ICD-10-CM

## 2024-05-30 DIAGNOSIS — E04.9 THYROID GOITER: ICD-10-CM

## 2024-05-30 DIAGNOSIS — Z36.2 ENCOUNTER FOR FOLLOW-UP ULTRASOUND OF FETAL ANATOMY: ICD-10-CM

## 2024-05-30 DIAGNOSIS — E66.01 SEVERE OBESITY DUE TO EXCESS CALORIES AFFECTING PREGNANCY IN SECOND TRIMESTER: ICD-10-CM

## 2024-05-30 DIAGNOSIS — O99.212 SEVERE OBESITY DUE TO EXCESS CALORIES AFFECTING PREGNANCY IN SECOND TRIMESTER: ICD-10-CM

## 2024-05-30 DIAGNOSIS — Z36.2 ENCOUNTER FOR FOLLOW-UP ULTRASOUND OF FETAL ANATOMY: Primary | ICD-10-CM

## 2024-05-30 DIAGNOSIS — O22.32 DEEP VEIN THROMBOSIS (DVT) AFFECTING PREGNANCY IN SECOND TRIMESTER: ICD-10-CM

## 2024-05-30 DIAGNOSIS — O24.415 GESTATIONAL DIABETES MELLITUS (GDM) IN SECOND TRIMESTER CONTROLLED ON ORAL HYPOGLYCEMIC DRUG: Primary | ICD-10-CM

## 2024-05-30 PROCEDURE — 1160F RVW MEDS BY RX/DR IN RCRD: CPT | Mod: CPTII,S$GLB,, | Performed by: OBSTETRICS & GYNECOLOGY

## 2024-05-30 PROCEDURE — 99213 OFFICE O/P EST LOW 20 MIN: CPT | Mod: S$GLB,,, | Performed by: OBSTETRICS & GYNECOLOGY

## 2024-05-30 PROCEDURE — 3044F HG A1C LEVEL LT 7.0%: CPT | Mod: CPTII,S$GLB,, | Performed by: OBSTETRICS & GYNECOLOGY

## 2024-05-30 PROCEDURE — 76811 OB US DETAILED SNGL FETUS: CPT | Mod: S$GLB,,, | Performed by: OBSTETRICS & GYNECOLOGY

## 2024-05-30 PROCEDURE — 3074F SYST BP LT 130 MM HG: CPT | Mod: CPTII,S$GLB,, | Performed by: OBSTETRICS & GYNECOLOGY

## 2024-05-30 PROCEDURE — 3079F DIAST BP 80-89 MM HG: CPT | Mod: CPTII,S$GLB,, | Performed by: OBSTETRICS & GYNECOLOGY

## 2024-05-30 PROCEDURE — 1159F MED LIST DOCD IN RCRD: CPT | Mod: CPTII,S$GLB,, | Performed by: OBSTETRICS & GYNECOLOGY

## 2024-05-30 PROCEDURE — 3008F BODY MASS INDEX DOCD: CPT | Mod: CPTII,S$GLB,, | Performed by: OBSTETRICS & GYNECOLOGY

## 2024-05-30 RX ORDER — METFORMIN HYDROCHLORIDE 500 MG/1
TABLET, EXTENDED RELEASE ORAL
COMMUNITY
Start: 2024-04-23

## 2024-05-30 NOTE — ASSESSMENT & PLAN NOTE
"The patient has a history of a PE 1 wk postpartum after her most recent pregnancy in . She delivered  (anencephaly) at 32 weeks and developed a PE one week later. She was on Eliquis x 6 months. She requested evaluation with a  d/t anencephaly.  ordered thrombophilia workup due to recent PE and prothrombin gene mutation discovered. She was, then, referred to hematology.    She has been tested for inherited thrombophilia and antiphospholipid syndrome (APLS). The results of her testing are significant for prothrombin gene mutation (heterozygous). The patient was counseled on her history of VTE and the increased risk of VTE during pregnancy and the postpartum period.   She has been followed closely by her hematologist, Dr Morton at [facility]. She most recently had a visit on 24. She is currently taking Lovenox 60mg once daily. Recommendation "Consider increasing to therapeutic dosing during postpartum period if mobility is limited"    Next hematology appt 5/3/24    Recommendations given her history:   1) Anticoagulation Recs   a) Prophylactic anticoagulation during the antepartum period/ prophylactic anticoagulation for 6 weeks postpartum. Per hematology, "Consider increasing to therapeutic dosing during postpartum period if mobility is limited"  -For patients with a history of PE, consider maternal echocardiogram to assess for right heart strain  b) Anticoagulation selection/dosing/monitoring during pregnancy  -We recommend use of LMWH/enoxaparin over unfractionated heparin If the patient is unable to fill this medication, or if there is a high likelihood for need to reverse this medication, unfractionated heparin should be prescribed.  Prophylaxis dosing per hematology: Lovenox 60mg once daily  c) Peripartum Management  -ASHELY hose/SCDs should be used while anticoagulation is interrupted.  - If on prophylactic dose of LMWH/UFH, the last dose should be 12 hours prior to neuraxial " anesthesia. For this reason, we no longer recommend conversion to UFH at 36 weeks.  d) Postpartum Management  -If on prophylactic dose and has a vaginal delivery WITHOUT epidural, anticoagulation should not be initiated any sooner than 6 hours postpartum.  -If on prophylactic dose and delivers WITH epidural or spinal (regardless of delivery type), anticoagulation should not be initiated any sooner than 12 hours postpartum and must be at least 4 hours since epidural removal (if had epidural).  -For patients at exceedingly high risk of VTE, this may need to be modified in consultation with MFM.  -Breastfeeding is compatible with warfarin, UFH, and LMWH.

## 2024-05-30 NOTE — PROGRESS NOTES
"Maternal Fetal Medicine follow up consult    SUBJECTIVE:     Rachel Gillespie is a 29 y.o.  female with IUP at 20w2d who is seen in follow up consultation by MFM.  Pregnancy complications include:   Problem   - BMI affecting pregnancy in second trimester   - History of anencephaly in prior pregnancy, currently pregnant   - Diabetes in second trimester controlled on oral hypoglycemic drug   - Thyroid goiter   - h/o PE complicating pregnancy     Rachel presents for routine follow up appointment.  She has a sugar log for review. She is doing well on metformin BID.  Denies LOF, VB, contractions. Positive fetal movement.    Previous notes reviewed.   No changes to medical, surgical, family, social, or obstetric history.    Interval history since last MFM visit: see above    Medications reviewed.    Care team members:  Dr Sandoval - Primary OB     OBJECTIVE:   /82 (BP Location: Left arm, Patient Position: Sitting, BP Method: Medium (Automatic))   Pulse 80   Ht 5' 2" (1.575 m)   Wt 107 kg (235 lb 14.3 oz)   LMP 2024 (Exact Date)   BMI 43.15 kg/m²     Ultrasound performed. See viewpoint for full ultrasound report.    A detailed fetal anatomic ultrasound examination was performed for the following high risk indication: Obesity.   No fetal structural malformations are identified; however, fetal imaging is incomplete today (as noted above).  A follow-up study will be scheduled to complete the fetal anatomic survey.   Fetal size today is consistent with established gestational age.   Cervical length by TA scanning is normal.   Placental location is anterior without evidence of previa.     ASSESSMENT/PLAN:     29 y.o.  female with IUP at 20w2d    - Diabetes in second trimester controlled on oral hypoglycemic drug  She has a history of prediabetes. She has been taking Metformin 500 BID prior to pregnancy but has discontinued this regimen. Her most recent A1C was 5.1  There is an increased risk of " diabetes in pregnancy given this history. She opted out of early 1h glucola screening and started checking her sugars which demonstrated elevated postprandial values. Counseling is as follows:    The patient was counseled regarding the risks of diabetes in pregnancy. These risks include but are not limited to an increased risk of , preeclampsia/gestational hypertension, fetal structural anomalies, macrosomia, prematurity, shoulder dystocia/birth injury,  hyperbilirubinemia and electrolyte issues, pulmonary immaturity and sudden stillbirth especially in those patients with poor glucose control. I also discussed with the patient the need for increased fetal surveillance with serial fetal growth assessments and third trimester fetal testing. I also reviewed the possibility of need for early delivery in those with poor glucose control or evidence of fetal growth abnormalities.    She presents with a sugar log today which demonstrates improved glycemic control. She's checking her sugars fasting and 1h PP    Recommendations:  Baseline evaluation (to be ordered by primary OB provider):  24 hour urine protein or urine P/C ratio, CBC, CMP  Maternal EKG; echocardiogram if BMI > 30 or EKG is abnormal  Maternal ophthalmic exam (if hasn't been performed in last year) and podiatry exam  Hemoglobin A1C every trimester  Diabetic education referral and counseling re: goal blood sugars (< 95 mg/dl for fasting, < 120 mg/dl for 2 hour postprandial)  Medications:   low dose aspirin 81 mg daily for preeclampsia risk reduction  1 mg folic acid daily  Regimen: Metformin 500mg BID  She will submit her log to our office on a weekly basis via email or portal for review and management   Ultrasounds with MFM:  Nuchal translucency scan at 12-13 weeks  Targeted anatomy survey at 20 weeks (scheduled with MFM)  Serial growth ultrasounds q 4-6 weeks at 26-28 weeks (scheduled with MFM)    A fetal echocardiogram is recommended at 22-24  weeks with pediatric cardiology (scheduled 24)    Initiate  surveillance at 32 weeks:   Weekly BPP or NST + AFV if good control.   If control is poor, twice weekly  fetal surveillance is recommended with BPP alternating with NST   Delivery timin 0/7 to 38 and 6/7 weeks if under good control without comorbidities  37 0/7 to 37 and 67 weeks if longstanding diabetes or poorly controlled, polyhydramnios, EFW>90th percentile, or BMI >= 40  36 0/7 to 37 and 6/7 weeks if vascular complications, prior stillbirth or other complicating conditions.  Recommend consideration of earlier delivery if IUGR, HTN, or other complications  Recommend offering  for delivery is EFW is 4500g or more near the time of delivery    Postpartum management  -Insulin should be reduced by 1/2 of the pre-delivery dose within the first 6-24 hours following delivery.  -Patients who were well-controlled on oral regimens can often return to these following delivery.  -Patients who are breastfeeding should be advised to have small snacks before breastfeeding to reduce the risk of hypoglycemia.  -Patients should be referred to primary MD or Endocrinology for postpartum management.    The patient was advised to call for blood sugars less than 70 or greater than 200 prior to her next appointment. She was also advised that if she notes nausea/vomiting, inability to tolerate PO, or concerns about being able to take her insulin that she should contact her provider or present to the OB ED.      - h/o PE complicating pregnancy  The patient has a history of a PE 1 wk postpartum after her most recent pregnancy in . She delivered  (anencephaly) at 32 weeks and developed a PE one week later. She was on Eliquis x 6 months. She requested evaluation with a  d/t anencephaly.  ordered thrombophilia workup due to recent PE and prothrombin gene mutation discovered. She was, then, referred to  "hematology.    She has been tested for inherited thrombophilia and antiphospholipid syndrome (APLS). The results of her testing are significant for prothrombin gene mutation (heterozygous). The patient was counseled on her history of VTE and the increased risk of VTE during pregnancy and the postpartum period.   She has been followed closely by her hematologist, Dr Morton at [facility]. She most recently had a visit on 2/7/24. She is currently taking Lovenox 60mg once daily. Recommendation "Consider increasing to therapeutic dosing during postpartum period if mobility is limited"    Next hematology appt 5/3/24    Recommendations given her history:   1) Anticoagulation Recs   a) Prophylactic anticoagulation during the antepartum period/ prophylactic anticoagulation for 6 weeks postpartum. Per hematology, "Consider increasing to therapeutic dosing during postpartum period if mobility is limited"  -For patients with a history of PE, consider maternal echocardiogram to assess for right heart strain  b) Anticoagulation selection/dosing/monitoring during pregnancy  -We recommend use of LMWH/enoxaparin over unfractionated heparin If the patient is unable to fill this medication, or if there is a high likelihood for need to reverse this medication, unfractionated heparin should be prescribed.  Prophylaxis dosing per hematology: Lovenox 60mg once daily  c) Peripartum Management  -ASHELY hose/SCDs should be used while anticoagulation is interrupted.  - If on prophylactic dose of LMWH/UFH, the last dose should be 12 hours prior to neuraxial anesthesia. For this reason, we no longer recommend conversion to UFH at 36 weeks.  d) Postpartum Management  -If on prophylactic dose and has a vaginal delivery WITHOUT epidural, anticoagulation should not be initiated any sooner than 6 hours postpartum.  -If on prophylactic dose and delivers WITH epidural or spinal (regardless of delivery type), anticoagulation should not be initiated any " sooner than 12 hours postpartum and must be at least 4 hours since epidural removal (if had epidural).  -For patients at exceedingly high risk of VTE, this may need to be modified in consultation with MFM.  -Breastfeeding is compatible with warfarin, UFH, and LMWH.      - Thyroid goiter  She has a history of thyroid goiter. She was on levothyroxine in the past. However, she is no longer taking this medication. Her endocrinologist, Dr Hilton, follows TFTs. Most recently checked on 3/14/24 TSH 1.045. Thyroid antibody testing negative. She is without complaints.  Recommend restarting Levothyroxine IF TSH >2.5 during pregnancy.    - History of anencephaly in prior pregnancy, currently pregnant  With her most recent pregnancy, her baby was diagnosed with anencephaly antenatally. She, then, developed polyhydramnios and had a subsequent amnioreduction for symptomatic relief at 32 weeks. Following the procedure, she PPROM'ed and had NRFHTs. She had an elective primary  in an effort to hold her baby while it was still alive prior to passing.    The risk of spina bifida/ neural tube defect after a previous sibling is affected is estimated to be about 4%. Cranial vault appears normal today on ultrasound and we will continue to reassess.    We recommend 4mg of folic acid daily.    - BMI affecting pregnancy in second trimester  Please see prior documentation for specific counseling.     BMI 43    Recommendations:  TWG goal is 11-20 lbs  Screen for signs/symptoms of obstructive sleep apnea  Nutritionist consult offered (this is to be ordered by primary OB provider)  Consider early 1 hr GCT (between 14-16 weeks gestation); repeat at 24-28 weeks gestation  Start low dose aspirin daily at 12 weeks for preeclampsia risk reduction  Targeted anatomical survey scheduled at 18-20 weeks  Fetal growth ultrasound at 32 weeks if BMI >= 40  Weekly  testing at 32 weeks if pre-pregnancy BMI >= 45  Lovenox VTE prophylaxis while  admitted to the hospital (antepartum or postpartum) if BMI >= 40.   Encouraged breastfeeding  Postpartum lifestyle modifications & weight loss      F/u in 4 weeks for MFM visit /growth ultrasound    Karyna Veloz MD  Maternal Fetal Medicine

## 2024-05-30 NOTE — ASSESSMENT & PLAN NOTE
She has a history of prediabetes. She has been taking Metformin 500 BID prior to pregnancy but has discontinued this regimen. Her most recent A1C was 5.1  There is an increased risk of diabetes in pregnancy given this history. She opted out of early 1h glucola screening and started checking her sugars which demonstrated elevated postprandial values. Counseling is as follows:    The patient was counseled regarding the risks of diabetes in pregnancy. These risks include but are not limited to an increased risk of , preeclampsia/gestational hypertension, fetal structural anomalies, macrosomia, prematurity, shoulder dystocia/birth injury,  hyperbilirubinemia and electrolyte issues, pulmonary immaturity and sudden stillbirth especially in those patients with poor glucose control. I also discussed with the patient the need for increased fetal surveillance with serial fetal growth assessments and third trimester fetal testing. I also reviewed the possibility of need for early delivery in those with poor glucose control or evidence of fetal growth abnormalities.    She presents with a sugar log today which demonstrates improved glycemic control. She's checking her sugars fasting and 1h PP    Recommendations:  Baseline evaluation (to be ordered by primary OB provider):  24 hour urine protein or urine P/C ratio, CBC, CMP  Maternal EKG; echocardiogram if BMI > 30 or EKG is abnormal  Maternal ophthalmic exam (if hasn't been performed in last year) and podiatry exam  Hemoglobin A1C every trimester  Diabetic education referral and counseling re: goal blood sugars (< 95 mg/dl for fasting, < 120 mg/dl for 2 hour postprandial)  Medications:   low dose aspirin 81 mg daily for preeclampsia risk reduction  1 mg folic acid daily  Regimen: Metformin 500mg BID  She will submit her log to our office on a weekly basis via email or portal for review and management   Ultrasounds with MFM:  Nuchal translucency scan at 12-13  weeks  Targeted anatomy survey at 20 weeks (scheduled with [facility])  Serial growth ultrasounds q 4-6 weeks at 26-28 weeks (scheduled with [facility])    A fetal echocardiogram is recommended at 22-24 weeks with pediatric cardiology (scheduled 24)    Initiate  surveillance at 32 weeks:   Weekly BPP or NST + AFV if good control.   If control is poor, twice weekly  fetal surveillance is recommended with BPP alternating with NST   Delivery timin 0/7 to 38 and 6/7 weeks if under good control without comorbidities  37 0/7 to 37 and 67 weeks if longstanding diabetes or poorly controlled, polyhydramnios, EFW>90th percentile, or BMI >= 40  36 0/7 to 37 and 6/7 weeks if vascular complications, prior stillbirth or other complicating conditions.  Recommend consideration of earlier delivery if IUGR, HTN, or other complications  Recommend offering  for delivery is EFW is 4500g or more near the time of delivery    Postpartum management  -Insulin should be reduced by 1/2 of the pre-delivery dose within the first 6-24 hours following delivery.  -Patients who were well-controlled on oral regimens can often return to these following delivery.  -Patients who are breastfeeding should be advised to have small snacks before breastfeeding to reduce the risk of hypoglycemia.  -Patients should be referred to primary MD or Endocrinology for postpartum management.    The patient was advised to call for blood sugars less than 70 or greater than 200 prior to her next appointment. She was also advised that if she notes nausea/vomiting, inability to tolerate PO, or concerns about being able to take her insulin that she should contact her provider or present to the OB ED.

## 2024-05-30 NOTE — ASSESSMENT & PLAN NOTE
Please see prior documentation for specific counseling.     BMI 43    Recommendations:  TWG goal is 11-20 lbs  Screen for signs/symptoms of obstructive sleep apnea  Nutritionist consult offered (this is to be ordered by primary OB provider)  Consider early 1 hr GCT (between 14-16 weeks gestation); repeat at 24-28 weeks gestation  Start low dose aspirin daily at 12 weeks for preeclampsia risk reduction  Targeted anatomical survey scheduled at 18-20 weeks  Fetal growth ultrasound at 32 weeks if BMI >= 40  Weekly  testing at 32 weeks if pre-pregnancy BMI >= 45  Lovenox VTE prophylaxis while admitted to the hospital (antepartum or postpartum) if BMI >= 40.   Encouraged breastfeeding  Postpartum lifestyle modifications & weight loss

## 2024-06-11 ENCOUNTER — OFFICE VISIT (OUTPATIENT)
Dept: PEDIATRIC CARDIOLOGY | Facility: CLINIC | Age: 29
End: 2024-06-11
Payer: COMMERCIAL

## 2024-06-11 VITALS
WEIGHT: 235.25 LBS | DIASTOLIC BLOOD PRESSURE: 79 MMHG | HEIGHT: 62 IN | BODY MASS INDEX: 43.29 KG/M2 | HEART RATE: 98 BPM | SYSTOLIC BLOOD PRESSURE: 128 MMHG

## 2024-06-11 DIAGNOSIS — O24.415 GESTATIONAL DIABETES MELLITUS (GDM) IN SECOND TRIMESTER CONTROLLED ON ORAL HYPOGLYCEMIC DRUG: ICD-10-CM

## 2024-06-11 DIAGNOSIS — O09.299 HISTORY OF ANENCEPHALY IN PRIOR PREGNANCY, CURRENTLY PREGNANT: ICD-10-CM

## 2024-06-11 PROCEDURE — 3078F DIAST BP <80 MM HG: CPT | Mod: CPTII,S$GLB,, | Performed by: PEDIATRICS

## 2024-06-11 PROCEDURE — 3008F BODY MASS INDEX DOCD: CPT | Mod: CPTII,S$GLB,, | Performed by: PEDIATRICS

## 2024-06-11 PROCEDURE — 99203 OFFICE O/P NEW LOW 30 MIN: CPT | Mod: 25,S$GLB,, | Performed by: PEDIATRICS

## 2024-06-11 PROCEDURE — 3074F SYST BP LT 130 MM HG: CPT | Mod: CPTII,S$GLB,, | Performed by: PEDIATRICS

## 2024-06-11 PROCEDURE — 3044F HG A1C LEVEL LT 7.0%: CPT | Mod: CPTII,S$GLB,, | Performed by: PEDIATRICS

## 2024-06-11 NOTE — PROGRESS NOTES
[facility] Pediatric Cardiology Fetal Cardiology Clinic    Today, I had the pleasure of evaluating Rachel Gillespie who is now 29 y.o. and carrying her third pregnancy at 22 weeks gestation with an JAMISON of 10/15/24. She was referred for evaluation of the fetal heart due to pre-existing maternal diabetes and poor views of the fetal heart on MFM evaluation.       She is carrying a male fetus to be named Dipesh.  Pregnancy thus far has been otherwise uncomplicated.     Obstetric History:    .  Her OB history is notable for prior pregnancy delivered at 32 wga, infant with anencephaly.     Past Medical History:   Diagnosis Date    Anemia, unspecified     Anxiety and depression     Blood clotting disorder     factor 2    Diabetic acetonemia     pre-diabetic    High blood pressure     Insomnia     Other pulmonary embolism without acute cor pulmonale     Sleep apnea, unspecified     Supraventricular tachycardia          Current Outpatient Medications:     acebutoloL (SECTRAL) 200 MG capsule, Take 200 mg by mouth 2 (two) times daily., Disp: , Rfl:     albuterol (PROVENTIL/VENTOLIN HFA) 90 mcg/actuation inhaler, Inhale 2 puffs into the lungs every 6 (six) hours as needed., Disp: , Rfl:     ascorbic acid, vitamin C, (VITAMIN C) 1000 MG tablet, Take 1,000 mg by mouth once daily., Disp: , Rfl:     aspirin (ECOTRIN) 81 MG EC tablet, Take 2 tablets (162 mg total) by mouth once daily. Start at 12 weeks gestation.  At 36 weeks gestation, decrease to one 81mg tablet daily., Disp: 60 tablet, Rfl: 6    blood sugar diagnostic (ONETOUCH VERIO TEST STRIPS) Strp, CHECK FASTING BLOOD SUGAR EVERY MORNING AS WELL AS 1 HOUR AFTER EATING 3 TIMES/DAY., Disp: 100 strip, Rfl: 1    blood sugar diagnostic Strp, To check BG 4 times daily, to use with insurance preferred meter. Check fasting blood sugar every morning as well as 1 hour after eating 3 times/day., Disp: 35 strip, Rfl: 0    blood-glucose meter (ONETOUCH VERIO REFLECT METER)  Misc, CHECK FASTING BLOOD SUGAR EVERY MORNING AS WELL AS 1 HOUR AFTER EATING 3 TIMES/DAY., Disp: 1 each, Rfl: 1    busPIRone (BUSPAR) 15 MG tablet, Take 15 mg by mouth 3 (three) times daily., Disp: , Rfl:     cholecalciferol, vitamin D3, (VITAMIN D3) 50 mcg (2,000 unit) Tab, Take by mouth once daily., Disp: , Rfl:     enoxaparin (LOVENOX) 60 mg/0.6 mL Syrg, , Disp: , Rfl:     famotidine (PEPCID) 20 MG tablet, Take 2 tablets (40 mg total) by mouth 2 (two) times daily., Disp: 120 tablet, Rfl: 11    ferrous sulfate (FEOSOL) 325 mg (65 mg iron) Tab tablet, Take by mouth 3 (three) times daily., Disp: , Rfl:     fluorometholone 0.1% (FML) 0.1 % DrpS, Apply 1 drop to eye 4 (four) times daily as needed., Disp: , Rfl:     hydrOXYzine (ATARAX) 50 MG tablet, Take 50 mg by mouth 4 (four) times daily., Disp: , Rfl:     metFORMIN (GLUCOPHAGE-XR) 500 MG ER 24hr tablet, , Disp: , Rfl:     metoclopramide HCl (REGLAN) 10 MG tablet, Take 1 tablet (10 mg total) by mouth every meal as needed (take 30 min prior to each meal as needed for prevention of nausea/vomiting)., Disp: 90 tablet, Rfl: 1    ondansetron (ZOFRAN-ODT) 4 MG TbDL, Take 1 tablet (4 mg total) by mouth every 6 (six) hours as needed. For nausea, Disp: 40 tablet, Rfl: 1    ONETOUCH DELICA PLUS LANCET 33 gauge Misc, Apply topically every morning., Disp: , Rfl:     PNV no.143-iron-methylfolate 29-1 mg Tab, Take 1 capsule by mouth Daily., Disp: 30 tablet, Rfl: 4    prenatal vit,calc76-iron-folic (PRENATABS RX) 29 mg iron- 1 mg Tab, TAKE 1 TABLET BY MOUTH EVERY DAY, Disp: 90 each, Rfl: 2    promethazine (PHENERGAN) 25 MG suppository, Place 1 suppository (25 mg total) rectally every 6 (six) hours as needed for Nausea., Disp: 10 suppository, Rfl: 0    scopolamine (TRANSDERM-SCOP) 1.3-1.5 mg (1 mg over 3 days), Place 1 patch onto the skin every 72 hours. For management of nausea/vomiting, Disp: 10 patch, Rfl: 1    sertraline (ZOLOFT) 50 MG tablet, Take 50 mg by mouth once daily.,  Disp: , Rfl:     triamcinolone acetonide 0.025% (KENALOG) 0.025 % cream, Apply 1 g topically 3 (three) times daily as needed., Disp: , Rfl:     zinc gluconate 50 mg tablet, Take 50 mg by mouth once daily., Disp: , Rfl:      Review of patient's allergies indicates:   Allergen Reactions    Latex Hives and Itching       Family History: Notable for prior child with anencephaly. Negative for congenital heart disease, early coronary artery disease, sudden unexplained death, connective tissues disorders, genetic syndromes, multiple miscarriages .     Social History: Ms. Rachel Gillespie is  to the father of the baby.  The father of the baby is involved.     FETAL ECHOCARDIOGRAM (summary):  Fetal echo at 22 wga, JAMISON 10/15/24  Technically limited fetal echocardiogram with all imaging being suboptimal  Normal four chamber view  Normal LVOT and aortic arch  The RVOT, pulmonary valve and ductal arch were not visualized  Normal biventricular structure and function.   (Full report in electronic medical record)    Impression:  Single active male fetus at 22 wga. Technically limited fetal echocardiogram due to maternal habitus and fetal position. The four chamber view, LVOT and aortic arch appear normal in difficult images. The RVOT, pulm valve and ductal arch were not visualized.     I discussed with her that fetal echocardiography is insufficiently sensitive to rule out all septal defects, anomalies of pulmonary and systemic veins, arch anomalies, and some valvar abnormalities, nor can it ensure that the ductus arteriosus and foramen ovale will spontaneously close.     Recommendations:  Given very difficult images, I do not think an additional fetal echo will be enlightening. Low threshold for  echo given poor imaging quality.     Location, timing, and mode of delivery will be determined by the obstetrical team.  She does not require further follow-up in the fetal echocardiography clinic, but I would be happy to  see her again if additional questions or concerns arise.          Debbie Romero MD, MSCI  Pediatric Cardiology  Pediatric Echocardiography, Fetal Echocardiography, Cardiac MRI  [facility] [address] [place]  [address]  Phone [phone], Fax [phone]        The above information was discussed in detail including the use of diagrams, with 30 minutes of total face to face time, with greater than 50% with counseling and coordination of care.  The discussion of the diagnosis and treatment options is as described above.

## 2024-06-27 DIAGNOSIS — O24.415 GESTATIONAL DIABETES MELLITUS (GDM) IN SECOND TRIMESTER CONTROLLED ON ORAL HYPOGLYCEMIC DRUG: Primary | ICD-10-CM

## 2024-07-02 ENCOUNTER — OFFICE VISIT (OUTPATIENT)
Dept: MATERNAL FETAL MEDICINE | Facility: CLINIC | Age: 29
End: 2024-07-02
Payer: COMMERCIAL

## 2024-07-02 ENCOUNTER — PROCEDURE VISIT (OUTPATIENT)
Dept: MATERNAL FETAL MEDICINE | Facility: CLINIC | Age: 29
End: 2024-07-02
Payer: COMMERCIAL

## 2024-07-02 VITALS
HEART RATE: 92 BPM | SYSTOLIC BLOOD PRESSURE: 120 MMHG | BODY MASS INDEX: 42.63 KG/M2 | DIASTOLIC BLOOD PRESSURE: 75 MMHG | WEIGHT: 231.69 LBS | HEIGHT: 62 IN

## 2024-07-02 DIAGNOSIS — O24.415 GESTATIONAL DIABETES MELLITUS (GDM) IN SECOND TRIMESTER CONTROLLED ON ORAL HYPOGLYCEMIC DRUG: ICD-10-CM

## 2024-07-02 DIAGNOSIS — O22.32 DEEP VEIN THROMBOSIS (DVT) AFFECTING PREGNANCY IN SECOND TRIMESTER: ICD-10-CM

## 2024-07-02 DIAGNOSIS — O09.299 HISTORY OF ANENCEPHALY IN PRIOR PREGNANCY, CURRENTLY PREGNANT: ICD-10-CM

## 2024-07-02 DIAGNOSIS — K21.9 GASTROESOPHAGEAL REFLUX DISEASE WITHOUT ESOPHAGITIS: ICD-10-CM

## 2024-07-02 DIAGNOSIS — E04.9 THYROID GOITER: ICD-10-CM

## 2024-07-02 DIAGNOSIS — O99.212 SEVERE OBESITY DUE TO EXCESS CALORIES AFFECTING PREGNANCY IN SECOND TRIMESTER: ICD-10-CM

## 2024-07-02 DIAGNOSIS — O99.342 MENTAL DISORDER AFFECTING PREGNANCY IN SECOND TRIMESTER: ICD-10-CM

## 2024-07-02 DIAGNOSIS — O99.612 GASTROESOPHAGEAL REFLUX DURING PREGNANCY IN SECOND TRIMESTER, ANTEPARTUM: Primary | ICD-10-CM

## 2024-07-02 DIAGNOSIS — K21.9 GASTROESOPHAGEAL REFLUX DURING PREGNANCY IN SECOND TRIMESTER, ANTEPARTUM: Primary | ICD-10-CM

## 2024-07-02 DIAGNOSIS — O10.919 HTN IN PREGNANCY, CHRONIC: ICD-10-CM

## 2024-07-02 DIAGNOSIS — E66.01 SEVERE OBESITY DUE TO EXCESS CALORIES AFFECTING PREGNANCY IN SECOND TRIMESTER: ICD-10-CM

## 2024-07-02 RX ORDER — OMEPRAZOLE 40 MG/1
40 CAPSULE, DELAYED RELEASE ORAL DAILY
Qty: 90 CAPSULE | Refills: 3 | Status: SHIPPED | OUTPATIENT
Start: 2024-07-02 | End: 2025-07-02

## 2024-07-02 NOTE — PROGRESS NOTES
"Maternal Fetal Medicine follow up consult    SUBJECTIVE:     Rachel Gillespie is a 29 y.o.  female with IUP at 25w0d who is seen in follow up consultation by MFM.  Pregnancy complications include:   Problem   - BMI affecting pregnancy in second trimester   - History of anencephaly in prior pregnancy, currently pregnant   - Diabetes in second trimester controlled on oral hypoglycemic drug   - Anxiety/depression affecting pregnancy in second trimester   - Thyroid goiter   - Gastroesophageal reflux disease without esophagitis   - HTN in pregnancy, chronic   - h/o PE complicating pregnancy     Rachel presents for routine follow up appointment.  Unfortunately, she has not submitted a sugar log to our clinic since May. She states she's forgotten to check her sugars regularly/submit weekly for review. She states she is still taking Metformin as prescribed.  Denies LOF, VB, contractions. Positive fetal movement.    Previous notes reviewed.   No changes to medical, surgical, family, social, or obstetric history.  Interval history since last MFM visit: see above  Medications reviewed.    Care team members:  Dr Sandoval - Primary OB     OBJECTIVE:   /75 (BP Location: Right arm)   Pulse 92   Ht 5' 2" (1.575 m)   Wt 105.1 kg (231 lb 11.3 oz)   LMP 2024 (Exact Date)   BMI 42.38 kg/m²     Ultrasound performed. See viewpoint for full ultrasound report.    A viable jimenez pregnancy is visualized in cephalic presentation.  Estimated fetal weight is at the 38th percentile with an abdominal circumference at the 21st percentile.    No fetal abnormalities are noted and anatomic survey is complete. Amniotic fluid volume is normal.  Placenta is anterior.      ASSESSMENT/PLAN:     29 y.o.  female with IUP at 25w0d    - HTN in pregnancy, chronic  Previously counseled the patient on maternal/fetal risks associated with CHTN during pregnancy. Risks include but not limited to fetal growth restriction, " miscarriage, abruption, maternal end organ disease (renal failure, MI, and stroke),  delivery, development of superimposed preeclampsia, and eclampsia.    Of note, she has a history of pre-eclampsia with her first pregnancy. She also has tachycardia. She is currently taking Acebutolol 200mg once daily (this regimen was started during her last pregnancy). She should continue this therapy without interruption.     Recommendations (Please refer to [facility] guidelines):  -Reduced aspirin dosing from 162mg to 81mg once daily for preeclampsia prevention since she is also taking Lovenox 60mg once daily  -Continue current medications: Acebutolol 200mg once daily  -Baseline evaluation with primary OB:   24-hour urine protein or baseline P/C ratio, CMP, and CBC - (24h-153mg)  Maternal EKG  Maternal ophthalmic evaluation  Maternal echocardiogram if HTN has been long-standing or EKG is abnormal  -Serial fetal growth ultrasounds every 4-6 weeks, beginning at 26-28 weeks.   -Continued close observation of patient's blood pressures. Avoid hypotension as this has been associated with uteroplacental insufficiency.  -In conjunction with the CHAP study recommendation for BP control: If BP is persistently ?140/90 antihypertensive medication is recommended with goal BP of 120-140/80-90.  -Weekly antepartum testing at 32 weeks (NST+AFV); twice weekly testing if control is poor, multiple comorbidities are present, or requires several medications for control   -Delivery timing:  No medications, no comorbid conditions: 39 0/7 - 39 6/7 weeks gestation  No medications, comorbid conditions: 38 0/7 - 38 6/7 weeks gestation  Controlled on single agent, no comorbid conditions: 38 0/7 - 38 6/7 weeks gestation  Controlled on single agent, comorbid conditions: 37 0/7 - 37 6/7 weeks gestation  Uncontrolled or requiring ? 2 medications: 36 0/7 - 37 6/7 weeks gestation    Comorbid conditions include BMI >= 40, diabetes, and complex medical  "condition associated with placental dysfunction (ie lupus or other vascular disease)  Delivery may be recommended earlier pending results of fetal growth ultrasounds, AFV assessment, or antepartum testing results.        - h/o PE complicating pregnancy  The patient has a history of a PE 1 wk postpartum after her most recent pregnancy in . She delivered  (anencephaly) at 32 weeks and developed a PE one week later. She was on Eliquis x 6 months. She requested evaluation with a  d/t anencephaly.  ordered thrombophilia workup due to recent PE and prothrombin gene mutation discovered. She was, then, referred to hematology.    She has been tested for inherited thrombophilia and antiphospholipid syndrome (APLS). The results of her testing are significant for prothrombin gene mutation (heterozygous). The patient was counseled on her history of VTE and the increased risk of VTE during pregnancy and the postpartum period.   She has been followed closely by her hematologist, Dr Morton at [facility]. She most recently had a visit on 24. She is currently taking Lovenox 60mg once daily. Recommendation "Consider increasing to therapeutic dosing during postpartum period if mobility is limited"    5/3/24- No changes from Hematology appt    Recommendations given her history:   1) Anticoagulation Recs   a) Prophylactic anticoagulation during the antepartum period/ prophylactic anticoagulation for 6 weeks postpartum. Per hematology, "Consider increasing to therapeutic dosing during postpartum period if mobility is limited"  -For patients with a history of PE, consider maternal echocardiogram to assess for right heart strain  b) Anticoagulation selection/dosing/monitoring during pregnancy  -We recommend use of LMWH/enoxaparin over unfractionated heparin If the patient is unable to fill this medication, or if there is a high likelihood for need to reverse this medication, unfractionated heparin should be " prescribed.  Prophylaxis dosing per hematology: Lovenox 60mg once daily  c) Peripartum Management  -ASHELY hose/SCDs should be used while anticoagulation is interrupted.  - If on prophylactic dose of LMWH/UFH, the last dose should be 12 hours prior to neuraxial anesthesia. For this reason, we no longer recommend conversion to UFH at 36 weeks.  d) Postpartum Management  -If on prophylactic dose and has a vaginal delivery WITHOUT epidural, anticoagulation should not be initiated any sooner than 6 hours postpartum.  -If on prophylactic dose and delivers WITH epidural or spinal (regardless of delivery type), anticoagulation should not be initiated any sooner than 12 hours postpartum and must be at least 4 hours since epidural removal (if had epidural).  -For patients at exceedingly high risk of VTE, this may need to be modified in consultation with MFM.  -Breastfeeding is compatible with warfarin, UFH, and LMWH.      - Thyroid goiter  She has a history of thyroid goiter. She was on levothyroxine in the past. However, she is no longer taking this medication. Her endocrinologist, Dr Hilton, follows TFTs. Most recently checked on 3/14/24 TSH 1.045. Thyroid antibody testing negative. She is without complaints.  Recommend restarting Levothyroxine IF TSH >2.5 during pregnancy.    - Diabetes in second trimester controlled on oral hypoglycemic drug  She has a history of prediabetes. She has been taking Metformin 500 BID prior to pregnancy but has discontinued this regimen. Her most recent A1C was 5.1  There is an increased risk of diabetes in pregnancy given this history. She opted out of early 1h glucola screening and started checking her sugars which demonstrated elevated postprandial values. Counseling is as follows:    The patient was counseled regarding the risks of diabetes in pregnancy. These risks include but are not limited to an increased risk of , preeclampsia/gestational hypertension, fetal structural anomalies,  macrosomia, prematurity, shoulder dystocia/birth injury,  hyperbilirubinemia and electrolyte issues, pulmonary immaturity and sudden stillbirth especially in those patients with poor glucose control. I also discussed with the patient the need for increased fetal surveillance with serial fetal growth assessments and third trimester fetal testing. I also reviewed the possibility of need for early delivery in those with poor glucose control or evidence of fetal growth abnormalities.    She has not submitted a sugar log to our clinic since May. We have encouraged compliance as there is a direct correlation with glycemic control and pregnancy outcome.    Recommendations:  Baseline evaluation (to be ordered by primary OB provider):  24 hour urine protein or urine P/C ratio, CBC, CMP  Maternal EKG; echocardiogram if BMI > 30 or EKG is abnormal  Maternal ophthalmic exam (if hasn't been performed in last year) and podiatry exam  Hemoglobin A1C every trimester  Diabetic education referral and counseling re: goal blood sugars (< 95 mg/dl for fasting, < 120 mg/dl for 2 hour postprandial)  Medications:   low dose aspirin 81 mg daily for preeclampsia risk reduction  1 mg folic acid daily  Regimen: Metformin 500mg BID  She will submit her log to our office on a weekly basis via email or portal for review and management   Ultrasounds with [facility]:  Nuchal translucency scan at 12-13 weeks  Targeted anatomy survey at 20 weeks (scheduled with M)  Serial growth ultrasounds q 4-6 weeks at 26-28 weeks (scheduled with [facility])    A fetal echocardiogram is recommended at 22-24 weeks with pediatric cardiology (completed 24)    Initiate  surveillance at 32 weeks:   Weekly BPP or NST + AFV if good control.   If control is poor, twice weekly  fetal surveillance is recommended with BPP alternating with NST   Delivery timin 0/7 to 38 and 6/7 weeks if under good control without comorbidities  37 0/7 to 37 and 67  weeks if longstanding diabetes or poorly controlled, polyhydramnios, EFW>90th percentile, or BMI >= 40  36 0/7 to 37 and 6/7 weeks if vascular complications, prior stillbirth or other complicating conditions.  Recommend consideration of earlier delivery if IUGR, HTN, or other complications  Recommend offering  for delivery is EFW is 4500g or more near the time of delivery    Postpartum management  -Insulin should be reduced by 1/2 of the pre-delivery dose within the first 6-24 hours following delivery.  -Patients who were well-controlled on oral regimens can often return to these following delivery.  -Patients who are breastfeeding should be advised to have small snacks before breastfeeding to reduce the risk of hypoglycemia.  -Patients should be referred to primary MD or Endocrinology for postpartum management.    The patient was advised to call for blood sugars less than 70 or greater than 200 prior to her next appointment. She was also advised that if she notes nausea/vomiting, inability to tolerate PO, or concerns about being able to take her insulin that she should contact her provider or present to the OB ED.      - History of anencephaly in prior pregnancy, currently pregnant  With her most recent pregnancy, her baby was diagnosed with anencephaly antenatally. She, then, developed polyhydramnios and had a subsequent amnioreduction for symptomatic relief at 32 weeks. Following the procedure, she PPROM'ed and had NRFHTs. She had an elective primary  in an effort to hold her baby while it was still alive prior to passing.    The risk of spina bifida/ neural tube defect after a previous sibling is affected is estimated to be about 4%. Cranial vault appears normal today on ultrasound and we will continue to reassess.    We recommend 4mg of folic acid daily.    - BMI affecting pregnancy in second trimester  Please see prior documentation for specific counseling.     BMI 42    Recommendations:  TWG  goal is 11-20 lbs  Screen for signs/symptoms of obstructive sleep apnea  Nutritionist consult offered (this is to be ordered by primary OB provider)  Encouraged breastfeeding  Postpartum lifestyle modifications & weight loss      - Gastroesophageal reflux disease without esophagitis  She has been taking Pepcid for GERD in pregnancy. However, she states this medication is not adequately relieving her symptoms. She's requesting to try Omeprazole- Rx sent.    - Anxiety/depression affecting pregnancy in second trimester  She reports a history of anxiety and depression. She is taking Zoloft 50mg daily and Hydroxyzine PRN. She reports improvement of symptoms with the utilization of this medication regimen. Discussed increased risk for peripartum and postpartum mood disorders. Precautions provided.      We discussed the usage of SSRIs in pregnancy and the minimal risks associated with the fetus.      We have discussed the importance of optimization of mental health conditions during pregnancy in an effort to reduce the risk of postpartum mood disorders. We have discussed options for management including psychotherapy, counseling, cognitive behavioral therapy, exercise/yoga, journaling, and psychiatry services. She will notify our office if she is interested in being referred for any of the above.      F/u in 4 weeks for MFM visit /growth ultrasound    Karyna Veloz MD  Maternal Fetal Medicine

## 2024-07-02 NOTE — ASSESSMENT & PLAN NOTE
"The patient has a history of a PE 1 wk postpartum after her most recent pregnancy in . She delivered  (anencephaly) at 32 weeks and developed a PE one week later. She was on Eliquis x 6 months. She requested evaluation with a  d/t anencephaly.  ordered thrombophilia workup due to recent PE and prothrombin gene mutation discovered. She was, then, referred to hematology.    She has been tested for inherited thrombophilia and antiphospholipid syndrome (APLS). The results of her testing are significant for prothrombin gene mutation (heterozygous). The patient was counseled on her history of VTE and the increased risk of VTE during pregnancy and the postpartum period.   She has been followed closely by her hematologist, Dr Morton at [facility]. She most recently had a visit on 24. She is currently taking Lovenox 60mg once daily. Recommendation "Consider increasing to therapeutic dosing during postpartum period if mobility is limited"    5/3/24- No changes from Hematology appt    Recommendations given her history:   1) Anticoagulation Recs   a) Prophylactic anticoagulation during the antepartum period/ prophylactic anticoagulation for 6 weeks postpartum. Per hematology, "Consider increasing to therapeutic dosing during postpartum period if mobility is limited"  -For patients with a history of PE, consider maternal echocardiogram to assess for right heart strain  b) Anticoagulation selection/dosing/monitoring during pregnancy  -We recommend use of LMWH/enoxaparin over unfractionated heparin If the patient is unable to fill this medication, or if there is a high likelihood for need to reverse this medication, unfractionated heparin should be prescribed.  Prophylaxis dosing per hematology: Lovenox 60mg once daily  c) Peripartum Management  -ASHELY hose/SCDs should be used while anticoagulation is interrupted.  - If on prophylactic dose of LMWH/UFH, the last dose should be 12 hours prior to " neuraxial anesthesia. For this reason, we no longer recommend conversion to UFH at 36 weeks.  d) Postpartum Management  -If on prophylactic dose and has a vaginal delivery WITHOUT epidural, anticoagulation should not be initiated any sooner than 6 hours postpartum.  -If on prophylactic dose and delivers WITH epidural or spinal (regardless of delivery type), anticoagulation should not be initiated any sooner than 12 hours postpartum and must be at least 4 hours since epidural removal (if had epidural).  -For patients at exceedingly high risk of VTE, this may need to be modified in consultation with MFM.  -Breastfeeding is compatible with warfarin, UFH, and LMWH.

## 2024-07-02 NOTE — ASSESSMENT & PLAN NOTE
Previously counseled the patient on maternal/fetal risks associated with CHTN during pregnancy. Risks include but not limited to fetal growth restriction, miscarriage, abruption, maternal end organ disease (renal failure, MI, and stroke),  delivery, development of superimposed preeclampsia, and eclampsia.    Of note, she has a history of pre-eclampsia with her first pregnancy. She also has tachycardia. She is currently taking Acebutolol 200mg once daily (this regimen was started during her last pregnancy). She should continue this therapy without interruption.     Recommendations (Please refer to [facility] guidelines):  -Reduced aspirin dosing from 162mg to 81mg once daily for preeclampsia prevention since she is also taking Lovenox 60mg once daily  -Continue current medications: Acebutolol 200mg once daily  -Baseline evaluation with primary OB:   24-hour urine protein or baseline P/C ratio, CMP, and CBC - (24h-153mg)  Maternal EKG  Maternal ophthalmic evaluation  Maternal echocardiogram if HTN has been long-standing or EKG is abnormal  -Serial fetal growth ultrasounds every 4-6 weeks, beginning at 26-28 weeks.   -Continued close observation of patient's blood pressures. Avoid hypotension as this has been associated with uteroplacental insufficiency.  -In conjunction with the CHAP study recommendation for BP control: If BP is persistently ?140/90 antihypertensive medication is recommended with goal BP of 120-140/80-90.  -Weekly antepartum testing at 32 weeks (NST+AFV); twice weekly testing if control is poor, multiple comorbidities are present, or requires several medications for control   -Delivery timing:  No medications, no comorbid conditions: 39 0/7 - 39 6/7 weeks gestation  No medications, comorbid conditions: 38 0/7 - 38 6/7 weeks gestation  Controlled on single agent, no comorbid conditions: 38 0/7 - 38 6/7 weeks gestation  Controlled on single agent, comorbid conditions: 37 0/7 - 37 6/7 weeks  gestation  Uncontrolled or requiring ? 2 medications: 36 0/7 - 37 6/7 weeks gestation    Comorbid conditions include BMI >= 40, diabetes, and complex medical condition associated with placental dysfunction (ie lupus or other vascular disease)  Delivery may be recommended earlier pending results of fetal growth ultrasounds, AFV assessment, or antepartum testing results.

## 2024-07-02 NOTE — ASSESSMENT & PLAN NOTE
She has a history of prediabetes. She has been taking Metformin 500 BID prior to pregnancy but has discontinued this regimen. Her most recent A1C was 5.1  There is an increased risk of diabetes in pregnancy given this history. She opted out of early 1h glucola screening and started checking her sugars which demonstrated elevated postprandial values. Counseling is as follows:    The patient was counseled regarding the risks of diabetes in pregnancy. These risks include but are not limited to an increased risk of , preeclampsia/gestational hypertension, fetal structural anomalies, macrosomia, prematurity, shoulder dystocia/birth injury,  hyperbilirubinemia and electrolyte issues, pulmonary immaturity and sudden stillbirth especially in those patients with poor glucose control. I also discussed with the patient the need for increased fetal surveillance with serial fetal growth assessments and third trimester fetal testing. I also reviewed the possibility of need for early delivery in those with poor glucose control or evidence of fetal growth abnormalities.    She has not submitted a sugar log to our clinic since May. We have encouraged compliance as there is a direct correlation with glycemic control and pregnancy outcome.    Recommendations:  Baseline evaluation (to be ordered by primary OB provider):  24 hour urine protein or urine P/C ratio, CBC, CMP  Maternal EKG; echocardiogram if BMI > 30 or EKG is abnormal  Maternal ophthalmic exam (if hasn't been performed in last year) and podiatry exam  Hemoglobin A1C every trimester  Diabetic education referral and counseling re: goal blood sugars (< 95 mg/dl for fasting, < 120 mg/dl for 2 hour postprandial)  Medications:   low dose aspirin 81 mg daily for preeclampsia risk reduction  1 mg folic acid daily  Regimen: Metformin 500mg BID  She will submit her log to our office on a weekly basis via email or portal for review and management   Ultrasounds with  MFM:  Nuchal translucency scan at 12-13 weeks  Targeted anatomy survey at 20 weeks (scheduled with MFM)  Serial growth ultrasounds q 4-6 weeks at 26-28 weeks (scheduled with MFM)    A fetal echocardiogram is recommended at 22-24 weeks with pediatric cardiology (completed 24)    Initiate  surveillance at 32 weeks:   Weekly BPP or NST + AFV if good control.   If control is poor, twice weekly  fetal surveillance is recommended with BPP alternating with NST   Delivery timin 0/7 to 38 and 6/7 weeks if under good control without comorbidities  37 0/7 to 37 and 67 weeks if longstanding diabetes or poorly controlled, polyhydramnios, EFW>90th percentile, or BMI >= 40  36 0/7 to 37 and 6/7 weeks if vascular complications, prior stillbirth or other complicating conditions.  Recommend consideration of earlier delivery if IUGR, HTN, or other complications  Recommend offering  for delivery is EFW is 4500g or more near the time of delivery    Postpartum management  -Insulin should be reduced by 1/2 of the pre-delivery dose within the first 6-24 hours following delivery.  -Patients who were well-controlled on oral regimens can often return to these following delivery.  -Patients who are breastfeeding should be advised to have small snacks before breastfeeding to reduce the risk of hypoglycemia.  -Patients should be referred to primary MD or Endocrinology for postpartum management.    The patient was advised to call for blood sugars less than 70 or greater than 200 prior to her next appointment. She was also advised that if she notes nausea/vomiting, inability to tolerate PO, or concerns about being able to take her insulin that she should contact her provider or present to the OB ED.

## 2024-07-02 NOTE — ASSESSMENT & PLAN NOTE
She has been taking Pepcid for GERD in pregnancy. However, she states this medication is not adequately relieving her symptoms. She's requesting to try Omeprazole- Rx sent.

## 2024-07-12 ENCOUNTER — PATIENT MESSAGE (OUTPATIENT)
Dept: MATERNAL FETAL MEDICINE | Facility: CLINIC | Age: 29
End: 2024-07-12
Payer: COMMERCIAL

## 2024-07-15 ENCOUNTER — TELEPHONE (OUTPATIENT)
Dept: MATERNAL FETAL MEDICINE | Facility: CLINIC | Age: 29
End: 2024-07-15
Payer: COMMERCIAL

## 2024-07-15 NOTE — TELEPHONE ENCOUNTER
I spoke with pt regarding the letter, she would like for me to email it to her. Letter emailed to pt. Pt reported she received the letter.

## 2024-07-15 NOTE — TELEPHONE ENCOUNTER
----- Message from Humaira Arceo NP sent at 7/13/2024  8:57 AM CDT -----  Regarding: print and sign  Rachel sent a portal message requesting a letter for her work. I have typed up this letter in the communications section of her chart. Danielle, can you please print this letter and have Dr Veloz sign it? From there, please call Rachel to arrange her getting the letter. Not sure if she needs it emailed or faxed to her work.    Thank you!!  Fal

## 2024-07-19 ENCOUNTER — PATIENT MESSAGE (OUTPATIENT)
Dept: MATERNAL FETAL MEDICINE | Facility: CLINIC | Age: 29
End: 2024-07-19
Payer: COMMERCIAL

## 2024-07-20 RX ORDER — METFORMIN HYDROCHLORIDE 500 MG/1
TABLET, EXTENDED RELEASE ORAL
Qty: 180 TABLET | Refills: 1 | Status: SHIPPED | OUTPATIENT
Start: 2024-07-20 | End: 2024-09-18

## 2024-07-30 DIAGNOSIS — O24.415 GESTATIONAL DIABETES MELLITUS (GDM) IN THIRD TRIMESTER CONTROLLED ON ORAL HYPOGLYCEMIC DRUG: ICD-10-CM

## 2024-07-30 DIAGNOSIS — O22.33 DEEP VEIN THROMBOSIS (DVT) AFFECTING PREGNANCY IN THIRD TRIMESTER: ICD-10-CM

## 2024-07-30 DIAGNOSIS — K21.9 GASTROESOPHAGEAL REFLUX DISEASE WITHOUT ESOPHAGITIS: Primary | ICD-10-CM

## 2024-07-31 ENCOUNTER — PROCEDURE VISIT (OUTPATIENT)
Dept: MATERNAL FETAL MEDICINE | Facility: CLINIC | Age: 29
End: 2024-07-31
Payer: COMMERCIAL

## 2024-07-31 ENCOUNTER — OFFICE VISIT (OUTPATIENT)
Dept: MATERNAL FETAL MEDICINE | Facility: CLINIC | Age: 29
End: 2024-07-31
Payer: COMMERCIAL

## 2024-07-31 VITALS
BODY MASS INDEX: 42.55 KG/M2 | HEIGHT: 62 IN | WEIGHT: 231.25 LBS | DIASTOLIC BLOOD PRESSURE: 72 MMHG | HEART RATE: 98 BPM | SYSTOLIC BLOOD PRESSURE: 119 MMHG | RESPIRATION RATE: 20 BRPM

## 2024-07-31 DIAGNOSIS — O09.299 HISTORY OF ANENCEPHALY IN PRIOR PREGNANCY, CURRENTLY PREGNANT: ICD-10-CM

## 2024-07-31 DIAGNOSIS — E04.9 THYROID GOITER: ICD-10-CM

## 2024-07-31 DIAGNOSIS — E66.01 SEVERE OBESITY DUE TO EXCESS CALORIES AFFECTING PREGNANCY IN THIRD TRIMESTER: ICD-10-CM

## 2024-07-31 DIAGNOSIS — O99.343 MENTAL DISORDER AFFECTING PREGNANCY IN THIRD TRIMESTER: ICD-10-CM

## 2024-07-31 DIAGNOSIS — O24.415 GESTATIONAL DIABETES MELLITUS (GDM) IN THIRD TRIMESTER CONTROLLED ON ORAL HYPOGLYCEMIC DRUG: ICD-10-CM

## 2024-07-31 DIAGNOSIS — O10.919 HTN IN PREGNANCY, CHRONIC: Primary | ICD-10-CM

## 2024-07-31 DIAGNOSIS — O99.213 SEVERE OBESITY DUE TO EXCESS CALORIES AFFECTING PREGNANCY IN THIRD TRIMESTER: ICD-10-CM

## 2024-07-31 DIAGNOSIS — O22.33 DEEP VEIN THROMBOSIS (DVT) AFFECTING PREGNANCY IN THIRD TRIMESTER: ICD-10-CM

## 2024-07-31 DIAGNOSIS — K21.9 GASTROESOPHAGEAL REFLUX DISEASE WITHOUT ESOPHAGITIS: ICD-10-CM

## 2024-07-31 RX ORDER — FERROUS SULFATE 325(65) MG
TABLET ORAL
COMMUNITY

## 2024-07-31 NOTE — ASSESSMENT & PLAN NOTE
Please see prior documentation for specific counseling.     BMI 42    Recommendations:  TWG goal is 11-20 lbs  Screen for signs/symptoms of obstructive sleep apnea  Nutritionist consult offered (this is to be ordered by primary OB provider)  Encouraged breastfeeding  Postpartum lifestyle modifications & weight loss

## 2024-07-31 NOTE — ASSESSMENT & PLAN NOTE
"The patient has a history of a PE 1 wk postpartum after her most recent pregnancy in . She delivered  (anencephaly) at 32 weeks and developed a PE one week later. She was on Eliquis x 6 months. She requested evaluation with a  d/t anencephaly.  ordered thrombophilia workup due to recent PE and prothrombin gene mutation discovered. She was, then, referred to hematology.    She has been tested for inherited thrombophilia and antiphospholipid syndrome (APLS). The results of her testing are significant for prothrombin gene mutation (heterozygous). The patient was counseled on her history of VTE and the increased risk of VTE during pregnancy and the postpartum period.   She has been followed closely by her hematologist, Dr Morton at [facility]. She most recently had a visit on 24. She is currently taking Lovenox 60mg once daily. Recommendation "Consider increasing to therapeutic dosing during postpartum period if mobility is limited"    5/3/24- No changes from Hematology appt    Recommendations given her history:   1) Anticoagulation Recs   a) Prophylactic anticoagulation during the antepartum period/ prophylactic anticoagulation for 6 weeks postpartum. Per hematology, "Consider increasing to therapeutic dosing during postpartum period if mobility is limited"  -For patients with a history of PE, consider maternal echocardiogram to assess for right heart strain  b) Anticoagulation selection/dosing/monitoring during pregnancy  -We recommend use of LMWH/enoxaparin over unfractionated heparin If the patient is unable to fill this medication, or if there is a high likelihood for need to reverse this medication, unfractionated heparin should be prescribed.  Prophylaxis dosing per hematology: Lovenox 60mg once daily  c) Peripartum Management  -ASHELY hose/SCDs should be used while anticoagulation is interrupted.  - If on prophylactic dose of LMWH/UFH, the last dose should be 12 hours prior to " neuraxial anesthesia. For this reason, we no longer recommend conversion to UFH at 36 weeks.  d) Postpartum Management  -If on prophylactic dose and has a vaginal delivery WITHOUT epidural, anticoagulation should not be initiated any sooner than 6 hours postpartum.  -If on prophylactic dose and delivers WITH epidural or spinal (regardless of delivery type), anticoagulation should not be initiated any sooner than 12 hours postpartum and must be at least 4 hours since epidural removal (if had epidural).  -For patients at exceedingly high risk of VTE, this may need to be modified in consultation with MFM.  -Breastfeeding is compatible with warfarin, UFH, and LMWH.

## 2024-07-31 NOTE — ASSESSMENT & PLAN NOTE
She has a history of prediabetes. She has been taking Metformin 500 BID prior to pregnancy but has discontinued this regimen. Her most recent A1C was 5.1  There is an increased risk of diabetes in pregnancy given this history. She opted out of early 1h glucola screening and started checking her sugars which demonstrated elevated postprandial values. Counseling is as follows:    The patient was counseled regarding the risks of diabetes in pregnancy. These risks include but are not limited to an increased risk of , preeclampsia/gestational hypertension, fetal structural anomalies, macrosomia, prematurity, shoulder dystocia/birth injury,  hyperbilirubinemia and electrolyte issues, pulmonary immaturity and sudden stillbirth especially in those patients with poor glucose control. I also discussed with the patient the need for increased fetal surveillance with serial fetal growth assessments and third trimester fetal testing. I also reviewed the possibility of need for early delivery in those with poor glucose control or evidence of fetal growth abnormalities.    24- Presents with a sugar log today which demonstrate some fasting and a few postprandial values out of range and she is missing some values. She reports infrequently eating a bedtime snack and notices that when she does remember, her fasting value is within range. Reinforced importance of high protein bedtime snack. No changes to meds.    Recommendations:  Baseline evaluation (to be ordered by primary OB provider):  24 hour urine protein or urine P/C ratio, CBC, CMP  Maternal EKG; echocardiogram if BMI > 30 or EKG is abnormal  Maternal ophthalmic exam (if hasn't been performed in last year) and podiatry exam  Hemoglobin A1C every trimester  Diabetic education referral and counseling re: goal blood sugars (< 95 mg/dl for fasting, < 120 mg/dl for 2 hour postprandial)  Medications:   low dose aspirin 81 mg daily for preeclampsia risk  reduction  1 mg folic acid daily  Regimen: Metformin 500mg QAM and 1000mg QHS  She will submit her log to our office on a weekly basis via email or portal for review and management   Ultrasounds with [facility]:  Nuchal translucency scan at 12-13 weeks  Targeted anatomy survey at 20 weeks (scheduled with [facility])  Serial growth ultrasounds q 4-6 weeks at 26-28 weeks (scheduled with [facility])    A fetal echocardiogram is recommended at 22-24 weeks with pediatric cardiology (completed 24)    Initiate  surveillance at 32 weeks:   Weekly BPP or NST + AFV if good control.   If control is poor, twice weekly  fetal surveillance is recommended with BPP alternating with NST   Delivery timin 0/7 to 38 and 6/7 weeks if under good control without comorbidities  37 0/7 to 37 and 67 weeks if longstanding diabetes or poorly controlled, polyhydramnios, EFW>90th percentile, or BMI >= 40  36 0/7 to 37 and 6/7 weeks if vascular complications, prior stillbirth or other complicating conditions.  Recommend consideration of earlier delivery if IUGR, HTN, or other complications  Recommend offering  for delivery is EFW is 4500g or more near the time of delivery    Postpartum management  -Insulin should be reduced by 1/2 of the pre-delivery dose within the first 6-24 hours following delivery.  -Patients who were well-controlled on oral regimens can often return to these following delivery.  -Patients who are breastfeeding should be advised to have small snacks before breastfeeding to reduce the risk of hypoglycemia.  -Patients should be referred to primary MD or Endocrinology for postpartum management.    The patient was advised to call for blood sugars less than 70 or greater than 200 prior to her next appointment. She was also advised that if she notes nausea/vomiting, inability to tolerate PO, or concerns about being able to take her insulin that she should contact her provider or present to the OB ED.

## 2024-07-31 NOTE — ASSESSMENT & PLAN NOTE
Previously counseled the patient on maternal/fetal risks associated with CHTN during pregnancy. Risks include but not limited to fetal growth restriction, miscarriage, abruption, maternal end organ disease (renal failure, MI, and stroke),  delivery, development of superimposed preeclampsia, and eclampsia.    Of note, she has a history of pre-eclampsia with her first pregnancy. She also has tachycardia. She is currently taking Acebutolol 200mg once daily (this regimen was started during her last pregnancy). She should continue this therapy without interruption.     Recommendations (Please refer to [facility] guidelines):  -Reduced aspirin dosing from 162mg to 81mg once daily for preeclampsia prevention since she is also taking Lovenox 60mg once daily  -Continue current medications: Acebutolol 200mg once daily  -Baseline evaluation with primary OB:   24-hour urine protein or baseline P/C ratio, CMP, and CBC - (24h-153mg)  Maternal EKG  Maternal ophthalmic evaluation  Maternal echocardiogram if HTN has been long-standing or EKG is abnormal  -Serial fetal growth ultrasounds every 4-6 weeks, beginning at 26-28 weeks.   -Continued close observation of patient's blood pressures. Avoid hypotension as this has been associated with uteroplacental insufficiency.  -In conjunction with the CHAP study recommendation for BP control: If BP is persistently ?140/90 antihypertensive medication is recommended with goal BP of 120-140/80-90.  -Weekly antepartum testing at 32 weeks (NST+AFV); twice weekly testing if control is poor, multiple comorbidities are present, or requires several medications for control   -Delivery timing:  No medications, no comorbid conditions: 39 0/7 - 39 6/7 weeks gestation  No medications, comorbid conditions: 38 0/7 - 38 6/7 weeks gestation  Controlled on single agent, no comorbid conditions: 38 0/7 - 38 6/7 weeks gestation  Controlled on single agent, comorbid conditions: 37 0/7 - 37 6/7 weeks  gestation  Uncontrolled or requiring ? 2 medications: 36 0/7 - 37 6/7 weeks gestation    Comorbid conditions include BMI >= 40, diabetes, and complex medical condition associated with placental dysfunction (ie lupus or other vascular disease)  Delivery may be recommended earlier pending results of fetal growth ultrasounds, AFV assessment, or antepartum testing results.

## 2024-07-31 NOTE — LETTER
July 13, 2024    Rachel Gillespie  [address] [address]             [facility]  [address] , [address]  [place] [id number] Dear employer of Rachel Gillespie:    Rachel is under the care of our high risk OB clinic. In an effort to aid in improving her pregnancy outcome, we require specific care to be performed at distinct intervals in comparison to her meals.  It is imperative for us to obtain this information. Please allow her to wear her watch in which she utilizes to set alarms for these checks.    If you have any questions or concerns, please don't hesitate to call.    Sincerely,        Karyna Veolz MD  [phone]

## 2024-07-31 NOTE — PROGRESS NOTES
"Maternal Fetal Medicine follow up consult    SUBJECTIVE:     Rachel Gillespie is a 29 y.o.  female with IUP at 29w1d who is seen in follow up consultation by MFM.  Pregnancy complications include:   Problem   - BMI affecting pregnancy in third trimester   - History of anencephaly in prior pregnancy, currently pregnant   - Gestational diabetes mellitus (GDM) in third trimester controlled on oral hypoglycemic drug   -Anxiety/depression affecting pregnancy in third trimester   - Thyroid goiter   - Gastroesophageal reflux disease without esophagitis   - HTN in pregnancy, chronic   - h/o PE complicating pregnancy     Rachel presents for routine follow up appointment.  She has a sugar log for review. She has some high fasting values when she does not eat a snack.   Denies LOF, VB, contractions. Positive fetal movement.    Previous notes reviewed.   No changes to medical, surgical, family, social, or obstetric history.  Interval history since last MFM visit: see above  Medications reviewed.    Care team members:  Dr Sandoval - Primary OB     OBJECTIVE:   /72   Pulse 98   Resp 20   Ht 5' 2" (1.575 m)   Wt 104.9 kg (231 lb 4.2 oz)   LMP 2024 (Exact Date)   BMI 42.30 kg/m²     Ultrasound performed. See viewpoint for full ultrasound report.    A viable jimenez pregnancy is visualized in cephalic presentation.  Estimated fetal weight is at the 37th percentile with an abdominal circumference at the 23rd percentile.    No fetal abnormalities are noted and previous anatomic survey was normal. Amniotic fluid volume is normal.  Placenta is anterior.        ASSESSMENT/PLAN:     29 y.o.  female with IUP at 29w1d    - HTN in pregnancy, chronic  Previously counseled the patient on maternal/fetal risks associated with CHTN during pregnancy. Risks include but not limited to fetal growth restriction, miscarriage, abruption, maternal end organ disease (renal failure, MI, and stroke),  delivery, " development of superimposed preeclampsia, and eclampsia.    Of note, she has a history of pre-eclampsia with her first pregnancy. She also has tachycardia. She is currently taking Acebutolol 200mg once daily (this regimen was started during her last pregnancy). She should continue this therapy without interruption.     Recommendations (Please refer to [facility] guidelines):  -Reduced aspirin dosing from 162mg to 81mg once daily for preeclampsia prevention since she is also taking Lovenox 60mg once daily  -Continue current medications: Acebutolol 200mg once daily  -Baseline evaluation with primary OB:   24-hour urine protein or baseline P/C ratio, CMP, and CBC - (24h-153mg)  Maternal EKG  Maternal ophthalmic evaluation  Maternal echocardiogram if HTN has been long-standing or EKG is abnormal  -Serial fetal growth ultrasounds every 4-6 weeks, beginning at 26-28 weeks.   -Continued close observation of patient's blood pressures. Avoid hypotension as this has been associated with uteroplacental insufficiency.  -In conjunction with the CHAP study recommendation for BP control: If BP is persistently ?140/90 antihypertensive medication is recommended with goal BP of 120-140/80-90.  -Weekly antepartum testing at 32 weeks (NST+AFV); twice weekly testing if control is poor, multiple comorbidities are present, or requires several medications for control   -Delivery timing:  No medications, no comorbid conditions: 39 0/7 - 39 6/7 weeks gestation  No medications, comorbid conditions: 38 0/7 - 38 6/7 weeks gestation  Controlled on single agent, no comorbid conditions: 38 0/7 - 38 6/7 weeks gestation  Controlled on single agent, comorbid conditions: 37 0/7 - 37 6/7 weeks gestation  Uncontrolled or requiring ? 2 medications: 36 0/7 - 37 6/7 weeks gestation    Comorbid conditions include BMI >= 40, diabetes, and complex medical condition associated with placental dysfunction (ie lupus or other vascular disease)  Delivery may be  "recommended earlier pending results of fetal growth ultrasounds, AFV assessment, or antepartum testing results.        -Anxiety/depression affecting pregnancy in third trimester  She reports a history of anxiety and depression. She is taking Zoloft 50mg daily and Hydroxyzine PRN. She reports improvement of symptoms with the utilization of this medication regimen. Discussed increased risk for peripartum and postpartum mood disorders. Precautions provided.      We discussed the usage of SSRIs in pregnancy and the minimal risks associated with the fetus.      We have discussed the importance of optimization of mental health conditions during pregnancy in an effort to reduce the risk of postpartum mood disorders. We have discussed options for management including psychotherapy, counseling, cognitive behavioral therapy, exercise/yoga, journaling, and psychiatry services. She will notify our office if she is interested in being referred for any of the above.      - h/o PE complicating pregnancy  The patient has a history of a PE 1 wk postpartum after her most recent pregnancy in . She delivered  (anencephaly) at 32 weeks and developed a PE one week later. She was on Eliquis x 6 months. She requested evaluation with a  d/t anencephaly.  ordered thrombophilia workup due to recent PE and prothrombin gene mutation discovered. She was, then, referred to hematology.    She has been tested for inherited thrombophilia and antiphospholipid syndrome (APLS). The results of her testing are significant for prothrombin gene mutation (heterozygous). The patient was counseled on her history of VTE and the increased risk of VTE during pregnancy and the postpartum period.   She has been followed closely by her hematologist, Dr Morton at [facility]. She most recently had a visit on 24. She is currently taking Lovenox 60mg once daily. Recommendation "Consider increasing to therapeutic dosing during " "postpartum period if mobility is limited"    5/3/24- No changes from Hematology appt    Recommendations given her history:   1) Anticoagulation Recs   a) Prophylactic anticoagulation during the antepartum period/ prophylactic anticoagulation for 6 weeks postpartum. Per hematology, "Consider increasing to therapeutic dosing during postpartum period if mobility is limited"  -For patients with a history of PE, consider maternal echocardiogram to assess for right heart strain  b) Anticoagulation selection/dosing/monitoring during pregnancy  -We recommend use of LMWH/enoxaparin over unfractionated heparin If the patient is unable to fill this medication, or if there is a high likelihood for need to reverse this medication, unfractionated heparin should be prescribed.  Prophylaxis dosing per hematology: Lovenox 60mg once daily  c) Peripartum Management  -ASHELY hose/SCDs should be used while anticoagulation is interrupted.  - If on prophylactic dose of LMWH/UFH, the last dose should be 12 hours prior to neuraxial anesthesia. For this reason, we no longer recommend conversion to UFH at 36 weeks.  d) Postpartum Management  -If on prophylactic dose and has a vaginal delivery WITHOUT epidural, anticoagulation should not be initiated any sooner than 6 hours postpartum.  -If on prophylactic dose and delivers WITH epidural or spinal (regardless of delivery type), anticoagulation should not be initiated any sooner than 12 hours postpartum and must be at least 4 hours since epidural removal (if had epidural).  -For patients at exceedingly high risk of VTE, this may need to be modified in consultation with M.  -Breastfeeding is compatible with warfarin, UFH, and LMWH.      - Thyroid goiter  She has a history of thyroid goiter. She was on levothyroxine in the past. However, she is no longer taking this medication. Her endocrinologist, Dr Hilton, follows TFTs. Most recently checked on 3/14/24 TSH 1.045. Thyroid antibody testing negative. " She is without complaints.  Recommend restarting Levothyroxine IF TSH >2.5 during pregnancy.    - Gestational diabetes mellitus (GDM) in third trimester controlled on oral hypoglycemic drug  She has a history of prediabetes. She has been taking Metformin 500 BID prior to pregnancy but has discontinued this regimen. Her most recent A1C was 5.1  There is an increased risk of diabetes in pregnancy given this history. She opted out of early 1h glucola screening and started checking her sugars which demonstrated elevated postprandial values. Counseling is as follows:    The patient was counseled regarding the risks of diabetes in pregnancy. These risks include but are not limited to an increased risk of , preeclampsia/gestational hypertension, fetal structural anomalies, macrosomia, prematurity, shoulder dystocia/birth injury,  hyperbilirubinemia and electrolyte issues, pulmonary immaturity and sudden stillbirth especially in those patients with poor glucose control. I also discussed with the patient the need for increased fetal surveillance with serial fetal growth assessments and third trimester fetal testing. I also reviewed the possibility of need for early delivery in those with poor glucose control or evidence of fetal growth abnormalities.    24- Presents with a sugar log today which demonstrate some fasting and a few postprandial values out of range and she is missing some values. She reports infrequently eating a bedtime snack and notices that when she does remember, her fasting value is within range. Reinforced importance of high protein bedtime snack. No changes to meds.    Recommendations:  Baseline evaluation (to be ordered by primary OB provider):  24 hour urine protein or urine P/C ratio, CBC, CMP  Maternal EKG; echocardiogram if BMI > 30 or EKG is abnormal  Maternal ophthalmic exam (if hasn't been performed in last year) and podiatry exam  Hemoglobin A1C every trimester  Diabetic  education referral and counseling re: goal blood sugars (< 95 mg/dl for fasting, < 120 mg/dl for 2 hour postprandial)  Medications:   low dose aspirin 81 mg daily for preeclampsia risk reduction  1 mg folic acid daily  Regimen: Metformin 500mg QAM and 1000mg QHS  She will submit her log to our office on a weekly basis via email or portal for review and management   Ultrasounds with [facility]:  Nuchal translucency scan at 12-13 weeks  Targeted anatomy survey at 20 weeks (scheduled with [facility])  Serial growth ultrasounds q 4-6 weeks at 26-28 weeks (scheduled with [facility])    A fetal echocardiogram is recommended at 22-24 weeks with pediatric cardiology (completed 24)    Initiate  surveillance at 32 weeks:   Weekly BPP or NST + AFV if good control.   If control is poor, twice weekly  fetal surveillance is recommended with BPP alternating with NST   Delivery timin 0/7 to 38 and 6/7 weeks if under good control without comorbidities  37 0/7 to 37 and 67 weeks if longstanding diabetes or poorly controlled, polyhydramnios, EFW>90th percentile, or BMI >= 40  36 0/7 to 37 and 6/7 weeks if vascular complications, prior stillbirth or other complicating conditions.  Recommend consideration of earlier delivery if IUGR, HTN, or other complications  Recommend offering  for delivery is EFW is 4500g or more near the time of delivery    Postpartum management  -Insulin should be reduced by 1/2 of the pre-delivery dose within the first 6-24 hours following delivery.  -Patients who were well-controlled on oral regimens can often return to these following delivery.  -Patients who are breastfeeding should be advised to have small snacks before breastfeeding to reduce the risk of hypoglycemia.  -Patients should be referred to primary MD or Endocrinology for postpartum management.    The patient was advised to call for blood sugars less than 70 or greater than 200 prior to her next appointment. She was also advised  that if she notes nausea/vomiting, inability to tolerate PO, or concerns about being able to take her insulin that she should contact her provider or present to the OB ED.      - Gastroesophageal reflux disease without esophagitis  She has been taking Pepcid for GERD in pregnancy. However, she states this medication is not adequately relieving her symptoms. She's requesting to try Omeprazole- Rx sent.    - History of anencephaly in prior pregnancy, currently pregnant  With her most recent pregnancy, her baby was diagnosed with anencephaly antenatally. She, then, developed polyhydramnios and had a subsequent amnioreduction for symptomatic relief at 32 weeks. Following the procedure, she PPROM'ed and had NRFHTs. She had an elective primary  in an effort to hold her baby while it was still alive prior to passing.    The risk of spina bifida/ neural tube defect after a previous sibling is affected is estimated to be about 4%. Cranial vault appears normal today on ultrasound and we will continue to reassess.    We recommend 4mg of folic acid daily.    - BMI affecting pregnancy in third trimester  Please see prior documentation for specific counseling.     BMI 42    Recommendations:  TWG goal is 11-20 lbs  Screen for signs/symptoms of obstructive sleep apnea  Nutritionist consult offered (this is to be ordered by primary OB provider)  Encouraged breastfeeding  Postpartum lifestyle modifications & weight loss      F/u in 4 weeks for MFM visit /growth ultrasound    Karyna Veloz MD  Maternal Fetal Medicine

## 2024-08-05 RX ORDER — OXYCODONE HYDROCHLORIDE 5 MG/1
5 TABLET ORAL EVERY 4 HOURS PRN
Qty: 10 TABLET | Refills: 0 | Status: SHIPPED | OUTPATIENT
Start: 2024-08-05

## 2024-08-22 ENCOUNTER — PATIENT MESSAGE (OUTPATIENT)
Dept: MATERNAL FETAL MEDICINE | Facility: CLINIC | Age: 29
End: 2024-08-22
Payer: COMMERCIAL

## 2024-08-22 DIAGNOSIS — O10.919 HTN IN PREGNANCY, CHRONIC: ICD-10-CM

## 2024-08-22 DIAGNOSIS — O24.415 GESTATIONAL DIABETES MELLITUS (GDM) IN THIRD TRIMESTER CONTROLLED ON ORAL HYPOGLYCEMIC DRUG: ICD-10-CM

## 2024-08-22 DIAGNOSIS — O99.343 MENTAL DISORDER AFFECTING PREGNANCY IN THIRD TRIMESTER: Primary | ICD-10-CM

## 2024-08-22 NOTE — TELEPHONE ENCOUNTER
Please call. Lunch time values persistently elevated. Diet compliance? If not, attempt to improve compliance. If reports compliance, plan to increase AM dose of Metformin to 1000mg. Or can start low dose fasting acting insulin at lunch time if having G.I. side effects from Metformin. Whichever she would prefer.

## 2024-08-22 NOTE — TELEPHONE ENCOUNTER
I called spoke with pt, regarding the rec. Per Humaira, pt reports she will just increase the Metformin to 1000 mg try it out if she develops any symptoms she has an appt with us on Tues she will let us know then.

## 2024-08-27 ENCOUNTER — PROCEDURE VISIT (OUTPATIENT)
Dept: MATERNAL FETAL MEDICINE | Facility: CLINIC | Age: 29
End: 2024-08-27
Payer: COMMERCIAL

## 2024-08-27 ENCOUNTER — TELEPHONE (OUTPATIENT)
Dept: MATERNAL FETAL MEDICINE | Facility: CLINIC | Age: 29
End: 2024-08-27

## 2024-08-27 ENCOUNTER — OFFICE VISIT (OUTPATIENT)
Dept: MATERNAL FETAL MEDICINE | Facility: CLINIC | Age: 29
End: 2024-08-27
Payer: COMMERCIAL

## 2024-08-27 VITALS
WEIGHT: 231.25 LBS | HEART RATE: 92 BPM | DIASTOLIC BLOOD PRESSURE: 79 MMHG | SYSTOLIC BLOOD PRESSURE: 127 MMHG | BODY MASS INDEX: 42.55 KG/M2 | HEIGHT: 62 IN

## 2024-08-27 DIAGNOSIS — O99.343 MENTAL DISORDER AFFECTING PREGNANCY IN THIRD TRIMESTER: ICD-10-CM

## 2024-08-27 DIAGNOSIS — O99.213 SEVERE OBESITY DUE TO EXCESS CALORIES AFFECTING PREGNANCY IN THIRD TRIMESTER: ICD-10-CM

## 2024-08-27 DIAGNOSIS — E66.01 SEVERE OBESITY DUE TO EXCESS CALORIES AFFECTING PREGNANCY IN THIRD TRIMESTER: ICD-10-CM

## 2024-08-27 DIAGNOSIS — O09.299 HISTORY OF ANENCEPHALY IN PRIOR PREGNANCY, CURRENTLY PREGNANT: ICD-10-CM

## 2024-08-27 DIAGNOSIS — O24.415 GESTATIONAL DIABETES MELLITUS (GDM) IN THIRD TRIMESTER CONTROLLED ON ORAL HYPOGLYCEMIC DRUG: ICD-10-CM

## 2024-08-27 DIAGNOSIS — E04.9 THYROID GOITER: ICD-10-CM

## 2024-08-27 DIAGNOSIS — K21.9 GASTROESOPHAGEAL REFLUX DISEASE WITHOUT ESOPHAGITIS: ICD-10-CM

## 2024-08-27 DIAGNOSIS — O10.919 HTN IN PREGNANCY, CHRONIC: ICD-10-CM

## 2024-08-27 DIAGNOSIS — O10.919 HTN IN PREGNANCY, CHRONIC: Primary | ICD-10-CM

## 2024-08-27 DIAGNOSIS — O22.33 DEEP VEIN THROMBOSIS (DVT) AFFECTING PREGNANCY IN THIRD TRIMESTER: ICD-10-CM

## 2024-08-27 PROCEDURE — 76816 OB US FOLLOW-UP PER FETUS: CPT | Mod: 59,S$GLB,, | Performed by: OBSTETRICS & GYNECOLOGY

## 2024-08-27 PROCEDURE — 99214 OFFICE O/P EST MOD 30 MIN: CPT | Mod: 25,S$GLB,, | Performed by: OBSTETRICS & GYNECOLOGY

## 2024-08-27 PROCEDURE — 3008F BODY MASS INDEX DOCD: CPT | Mod: CPTII,S$GLB,, | Performed by: OBSTETRICS & GYNECOLOGY

## 2024-08-27 PROCEDURE — 1159F MED LIST DOCD IN RCRD: CPT | Mod: CPTII,S$GLB,, | Performed by: OBSTETRICS & GYNECOLOGY

## 2024-08-27 PROCEDURE — 3074F SYST BP LT 130 MM HG: CPT | Mod: CPTII,S$GLB,, | Performed by: OBSTETRICS & GYNECOLOGY

## 2024-08-27 PROCEDURE — 76819 FETAL BIOPHYS PROFIL W/O NST: CPT | Mod: S$GLB,,, | Performed by: OBSTETRICS & GYNECOLOGY

## 2024-08-27 PROCEDURE — 1160F RVW MEDS BY RX/DR IN RCRD: CPT | Mod: CPTII,S$GLB,, | Performed by: OBSTETRICS & GYNECOLOGY

## 2024-08-27 PROCEDURE — 3078F DIAST BP <80 MM HG: CPT | Mod: CPTII,S$GLB,, | Performed by: OBSTETRICS & GYNECOLOGY

## 2024-08-27 PROCEDURE — 3044F HG A1C LEVEL LT 7.0%: CPT | Mod: CPTII,S$GLB,, | Performed by: OBSTETRICS & GYNECOLOGY

## 2024-08-27 RX ORDER — HYDROXYZINE PAMOATE 25 MG/1
50 CAPSULE ORAL NIGHTLY
COMMUNITY
End: 2024-08-27 | Stop reason: SDUPTHER

## 2024-08-27 RX ORDER — HYDROXYZINE HYDROCHLORIDE 25 MG/1
50 TABLET, FILM COATED ORAL EVERY 4 HOURS PRN
COMMUNITY

## 2024-08-27 NOTE — ASSESSMENT & PLAN NOTE
She has a history of prediabetes. She has been taking Metformin 500 BID prior to pregnancy but has discontinued this regimen. Her most recent A1C was 5.1  There is an increased risk of diabetes in pregnancy given this history. She opted out of early 1h glucola screening and started checking her sugars which demonstrated elevated postprandial values. Counseling is as follows:    The patient was counseled regarding the risks of diabetes in pregnancy. These risks include but are not limited to an increased risk of , preeclampsia/gestational hypertension, fetal structural anomalies, macrosomia, prematurity, shoulder dystocia/birth injury,  hyperbilirubinemia and electrolyte issues, pulmonary immaturity and sudden stillbirth especially in those patients with poor glucose control. I also discussed with the patient the need for increased fetal surveillance with serial fetal growth assessments and third trimester fetal testing. I also reviewed the possibility of need for early delivery in those with poor glucose control or evidence of fetal growth abnormalities.  24- Presents with a sugar log today which demonstrate some fasting and a few postprandial values out of range and she is missing some values. She reports infrequently eating a bedtime snack and notices that when she does remember, her fasting value is within range. Reinforced importance of high protein bedtime snack. No changes to meds.    24-She is taking Metformin 1000mg BID. She reports a new onset of significant G.I. side effects since increasing dosing. She would like to discontinue Metformin and add insulin. Will add Levemir or Lantus (whichever insurance covers) 10u BID    Recommendations:  Baseline evaluation (to be ordered by primary OB provider):  24 hour urine protein or urine P/C ratio, CBC, CMP  Maternal EKG; echocardiogram if BMI > 30 or EKG is abnormal  Maternal ophthalmic exam (if hasn't been performed in last year) and  podiatry exam  Hemoglobin A1C every trimester  Diabetic education referral and counseling re: goal blood sugars (< 95 mg/dl for fasting, < 120 mg/dl for 2 hour postprandial)  Medications:   low dose aspirin 81 mg daily for preeclampsia risk reduction  1 mg folic acid daily  Regimen: Levemir or Lantus 10u BID  She will submit her log to our office on a weekly basis via email or portal for review and management   Ultrasounds with [facility]:  Nuchal translucency scan at 12-13 weeks  Targeted anatomy survey at 20 weeks (scheduled with [facility])  Serial growth ultrasounds q 4-6 weeks at 26-28 weeks (scheduled with [facility])    A fetal echocardiogram is recommended at 22-24 weeks with pediatric cardiology (completed 24)    Initiate  surveillance at 32 weeks:   Weekly BPP or NST + AFV if good control.   If control is poor, twice weekly  fetal surveillance is recommended with BPP alternating with NST   Delivery timin 0/7 to 38 and 6/7 weeks if under good control without comorbidities  37 0/7 to 37 and 67 weeks if longstanding diabetes or poorly controlled, polyhydramnios, EFW>90th percentile, or BMI >= 40  36 0/7 to 37 and 6/7 weeks if vascular complications, prior stillbirth or other complicating conditions.  Recommend consideration of earlier delivery if IUGR, HTN, or other complications  Recommend offering  for delivery is EFW is 4500g or more near the time of delivery    Postpartum management  -Patients who were well-controlled on oral regimens can often return to these following delivery.  -Patients who are breastfeeding should be advised to have small snacks before breastfeeding to reduce the risk of hypoglycemia.  -Patients should be referred to primary MD or Endocrinology for postpartum management.    The patient was advised to call for blood sugars less than 70 or greater than 200 prior to her next appointment. She was also advised that if she notes nausea/vomiting, inability to tolerate PO, or  concerns about being able to take her insulin that she should contact her provider or present to the OB ED.

## 2024-08-27 NOTE — ASSESSMENT & PLAN NOTE
Previously counseled the patient on maternal/fetal risks associated with CHTN during pregnancy. Risks include but not limited to fetal growth restriction, miscarriage, abruption, maternal end organ disease (renal failure, MI, and stroke),  delivery, development of superimposed preeclampsia, and eclampsia.    Of note, she has a history of pre-eclampsia with her first pregnancy. She also has tachycardia. She is currently taking Acebutolol 200mg once daily (this regimen was started during her last pregnancy). She should continue this therapy without interruption.     Recommendations (Please refer to [facility] guidelines):  -Reduced aspirin dosing from 162mg to 81mg once daily for preeclampsia prevention since she is also taking Lovenox 60mg once daily  -Continue current medications: Acebutolol 200mg once daily  -Baseline evaluation with primary OB:   24-hour urine protein or baseline P/C ratio, CMP, and CBC - (24h-153mg)  Maternal EKG  Maternal ophthalmic evaluation  Maternal echocardiogram if HTN has been long-standing or EKG is abnormal  -Serial fetal growth ultrasounds every 4-6 weeks, beginning at 26-28 weeks.   -Continued close observation of patient's blood pressures. Avoid hypotension as this has been associated with uteroplacental insufficiency.  -In conjunction with the CHAP study recommendation for BP control: If BP is persistently ?140/90 antihypertensive medication is recommended with goal BP of 120-140/80-90.  -Weekly antepartum testing at 32 weeks (NST+AFV); twice weekly testing if control is poor, multiple comorbidities are present, or requires several medications for control   -Delivery timing:  No medications, no comorbid conditions: 39 0/7 - 39 6/7 weeks gestation  No medications, comorbid conditions: 38 0/7 - 38 6/7 weeks gestation  Controlled on single agent, no comorbid conditions: 38 0/7 - 38 6/7 weeks gestation  Controlled on single agent, comorbid conditions: 37 0/7 - 38 6/7 weeks  gestation- Ok to consider 38w if she has stable BP and no other changes. Hoping for .   Uncontrolled or requiring ? 2 medications: 36 0/7 - 37 6/7 weeks gestation    Comorbid conditions include BMI >= 40, diabetes, and complex medical condition associated with placental dysfunction (ie lupus or other vascular disease)  Delivery may be recommended earlier pending results of fetal growth ultrasounds, AFV assessment, or antepartum testing results.

## 2024-08-27 NOTE — TELEPHONE ENCOUNTER
V/O per Humaira, call in Levemir or Lantus pen 10u BID, also call in the pen needles to go along with it.    I called [facility] pharmacy in [place], rx's called in.

## 2024-08-27 NOTE — ASSESSMENT & PLAN NOTE
She has been taking Pepcid for GERD in pregnancy. However, she states this medication is not adequately relieving her symptoms. She's requesting to try Omeprazole- Rx sent.    8/27/24- GERD improved.

## 2024-08-27 NOTE — PROGRESS NOTES
"Maternal Fetal Medicine follow up consult    SUBJECTIVE:     Rachel Gillespie is a 29 y.o.  female with IUP at 33w0d who is seen in follow up consultation by MFM.  Pregnancy complications include:   Problem   - BMI affecting pregnancy in third trimester   - History of anencephaly in prior pregnancy, currently pregnant   - Gestational diabetes mellitus (GDM) in third trimester controlled on oral hypoglycemic drug   -Anxiety/depression affecting pregnancy in third trimester   - Thyroid goiter   - Gastroesophageal reflux disease without esophagitis   - HTN in pregnancy, chronic   - h/o PE complicating pregnancy     Rachel presents for routine follow up appointment.  She has experienced significant G.I. side effects since increasing dose of Metformin. Interested in transitioning to insulin regimen.  Still taking Lovenox ppx.  BP normal.  She desires  at 38 weeks.  Denies LOF, VB, contractions. Positive fetal movement.    Previous notes reviewed.   No changes to medical, surgical, family, social, or obstetric history.  Interval history since last MFM visit: see above  Medications reviewed.    Care team members:  Dr Sandoval - Primary OB     OBJECTIVE:   /79 (BP Location: Right arm)   Pulse 92   Ht 5' 2" (1.575 m)   Wt 104.9 kg (231 lb 4.2 oz)   LMP 2024 (Exact Date)   BMI 42.30 kg/m²     Ultrasound performed. See viewpoint for full ultrasound report.    A viable jimenez pregnancy is visualized in cephalic presentation.  Estimated fetal weight is at the 27th percentile with an abdominal circumference at the 23rd percentile.    No fetal abnormalities are noted and previous anatomic survey was normal. Amniotic fluid volume is normal.  Placenta is anterior. Biophysical profile is 8/8.       ASSESSMENT/PLAN:     29 y.o.  female with IUP at 33w0d    - HTN in pregnancy, chronic  Previously counseled the patient on maternal/fetal risks associated with CHTN during pregnancy. Risks include but " not limited to fetal growth restriction, miscarriage, abruption, maternal end organ disease (renal failure, MI, and stroke),  delivery, development of superimposed preeclampsia, and eclampsia.    Of note, she has a history of pre-eclampsia with her first pregnancy. She also has tachycardia. She is currently taking Acebutolol 200mg once daily (this regimen was started during her last pregnancy). She should continue this therapy without interruption.     Recommendations (Please refer to [facility] guidelines):  -Reduced aspirin dosing from 162mg to 81mg once daily for preeclampsia prevention since she is also taking Lovenox 60mg once daily  -Continue current medications: Acebutolol 200mg once daily  -Baseline evaluation with primary OB:   24-hour urine protein or baseline P/C ratio, CMP, and CBC - (24h-153mg)  Maternal EKG  Maternal ophthalmic evaluation  Maternal echocardiogram if HTN has been long-standing or EKG is abnormal  -Serial fetal growth ultrasounds every 4-6 weeks, beginning at 26-28 weeks.   -Continued close observation of patient's blood pressures. Avoid hypotension as this has been associated with uteroplacental insufficiency.  -In conjunction with the CHAP study recommendation for BP control: If BP is persistently ?140/90 antihypertensive medication is recommended with goal BP of 120-140/80-90.  -Weekly antepartum testing at 32 weeks (NST+AFV); twice weekly testing if control is poor, multiple comorbidities are present, or requires several medications for control   -Delivery timing:  No medications, no comorbid conditions: 39 0/7 - 39 6/7 weeks gestation  No medications, comorbid conditions: 38 0/7 - 38 6/7 weeks gestation  Controlled on single agent, no comorbid conditions: 38 0/7 - 38 6/7 weeks gestation  Controlled on single agent, comorbid conditions: 37 0/7 - 38 6/7 weeks gestation- Ok to consider 38w if she has stable BP and no other changes. Hoping for .   Uncontrolled or requiring ?  2 medications: 36 0/7 - 37 6/7 weeks gestation    Comorbid conditions include BMI >= 40, diabetes, and complex medical condition associated with placental dysfunction (ie lupus or other vascular disease)  Delivery may be recommended earlier pending results of fetal growth ultrasounds, AFV assessment, or antepartum testing results.        -Anxiety/depression affecting pregnancy in third trimester  She reports a history of anxiety and depression. She is taking Zoloft 50mg daily and Hydroxyzine PRN. She reports improvement of symptoms with the utilization of this medication regimen. Discussed increased risk for peripartum and postpartum mood disorders. Precautions provided.      We discussed the usage of SSRIs in pregnancy and the minimal risks associated with the fetus.      We have discussed the importance of optimization of mental health conditions during pregnancy in an effort to reduce the risk of postpartum mood disorders. We have discussed options for management including psychotherapy, counseling, cognitive behavioral therapy, exercise/yoga, journaling, and psychiatry services. She will notify our office if she is interested in being referred for any of the above.      - h/o PE complicating pregnancy  The patient has a history of a PE 1 wk postpartum after her most recent pregnancy in . She delivered  (anencephaly) at 32 weeks and developed a PE one week later. She was on Eliquis x 6 months. She requested evaluation with a  d/t anencephaly.  ordered thrombophilia workup due to recent PE and prothrombin gene mutation discovered. She was, then, referred to hematology.    She has been tested for inherited thrombophilia and antiphospholipid syndrome (APLS). The results of her testing are significant for prothrombin gene mutation (heterozygous). The patient was counseled on her history of VTE and the increased risk of VTE during pregnancy and the postpartum period.   She has been  "followed closely by her hematologist, Dr Morton at [facility]. She most recently had a visit on 24. She is currently taking Lovenox 60mg once daily. Recommendation "Consider increasing to therapeutic dosing during postpartum period if mobility is limited"    5/3/24- No changes from Hematology appt    24- She expressed interest in TOLAC given previous successful vaginal delivery in first pregnancy. If she has a successful vaginal delivery with her current pregnancy, ok to resume current Lovenox dose postpartum. If she has a  section, recommend increasing to therapeutic dose in postpartum period. She has a hematologist appt mid September (prior to delivery), appreciate recommendations.    Recommendations given her history:   1) Anticoagulation Recs   a) Prophylactic anticoagulation during the antepartum period/ prophylactic anticoagulation for 6 weeks postpartum. Per hematology, "Consider increasing to therapeutic dosing during postpartum period if mobility is limited"  -For patients with a history of PE, consider maternal echocardiogram to assess for right heart strain  b) Anticoagulation selection/dosing/monitoring during pregnancy  -We recommend use of LMWH/enoxaparin over unfractionated heparin If the patient is unable to fill this medication, or if there is a high likelihood for need to reverse this medication, unfractionated heparin should be prescribed.  Prophylaxis dosing per hematology: Lovenox 60mg once daily  c) Peripartum Management  -ASHELY hose/SCDs should be used while anticoagulation is interrupted.  - If on prophylactic dose of LMWH/UFH, the last dose should be 12 hours prior to neuraxial anesthesia. For this reason, we no longer recommend conversion to UFH at 36 weeks.  d) Postpartum Management  -If on prophylactic dose and has a vaginal delivery WITHOUT epidural, anticoagulation should not be initiated any sooner than 6 hours postpartum.  -If on prophylactic dose and delivers WITH " epidural or spinal (regardless of delivery type), anticoagulation should not be initiated any sooner than 12 hours postpartum and must be at least 4 hours since epidural removal (if had epidural).  -For patients at exceedingly high risk of VTE, this may need to be modified in consultation with MFM.  -Breastfeeding is compatible with warfarin, UFH, and LMWH.      - Thyroid goiter  She has a history of thyroid goiter. She was on levothyroxine in the past. However, she is no longer taking this medication. Her endocrinologist, Dr Hilton, follows TFTs. Most recently checked on 3/14/24 TSH 1.045. Thyroid antibody testing negative. She is without complaints.  Recommend restarting Levothyroxine IF TSH >2.5 during pregnancy.    - Gestational diabetes mellitus (GDM) in third trimester controlled on oral hypoglycemic drug  She has a history of prediabetes. She has been taking Metformin 500 BID prior to pregnancy but has discontinued this regimen. Her most recent A1C was 5.1  There is an increased risk of diabetes in pregnancy given this history. She opted out of early 1h glucola screening and started checking her sugars which demonstrated elevated postprandial values. Counseling is as follows:    The patient was counseled regarding the risks of diabetes in pregnancy. These risks include but are not limited to an increased risk of , preeclampsia/gestational hypertension, fetal structural anomalies, macrosomia, prematurity, shoulder dystocia/birth injury,  hyperbilirubinemia and electrolyte issues, pulmonary immaturity and sudden stillbirth especially in those patients with poor glucose control. I also discussed with the patient the need for increased fetal surveillance with serial fetal growth assessments and third trimester fetal testing. I also reviewed the possibility of need for early delivery in those with poor glucose control or evidence of fetal growth abnormalities.  24- Presents with a sugar log today  which demonstrate some fasting and a few postprandial values out of range and she is missing some values. She reports infrequently eating a bedtime snack and notices that when she does remember, her fasting value is within range. Reinforced importance of high protein bedtime snack. No changes to meds.    24-She is taking Metformin 1000mg BID. She reports a new onset of significant G.I. side effects since increasing dosing. She would like to discontinue Metformin and add insulin. Will add Levemir or Lantus (whichever insurance covers) 10u BID    Recommendations:  Baseline evaluation (to be ordered by primary OB provider):  24 hour urine protein or urine P/C ratio, CBC, CMP  Maternal EKG; echocardiogram if BMI > 30 or EKG is abnormal  Maternal ophthalmic exam (if hasn't been performed in last year) and podiatry exam  Hemoglobin A1C every trimester  Diabetic education referral and counseling re: goal blood sugars (< 95 mg/dl for fasting, < 120 mg/dl for 2 hour postprandial)  Medications:   low dose aspirin 81 mg daily for preeclampsia risk reduction  1 mg folic acid daily  Regimen: Levemir or Lantus 10u BID  She will submit her log to our office on a weekly basis via email or portal for review and management   Ultrasounds with [facility]:  Nuchal translucency scan at 12-13 weeks  Targeted anatomy survey at 20 weeks (scheduled with [facility])  Serial growth ultrasounds q 4-6 weeks at 26-28 weeks (scheduled with [facility])    A fetal echocardiogram is recommended at 22-24 weeks with pediatric cardiology (completed 24)    Initiate  surveillance at 32 weeks:   Weekly BPP or NST + AFV if good control.   If control is poor, twice weekly  fetal surveillance is recommended with BPP alternating with NST   Delivery timin 0/7 to 38 and 6/7 weeks if under good control without comorbidities  37 0/7 to 37 and 67 weeks if longstanding diabetes or poorly controlled, polyhydramnios, EFW>90th percentile, or BMI >= 40  36  0/7 to 37 and 6/7 weeks if vascular complications, prior stillbirth or other complicating conditions.  Recommend consideration of earlier delivery if IUGR, HTN, or other complications  Recommend offering  for delivery is EFW is 4500g or more near the time of delivery    Postpartum management  -Patients who were well-controlled on oral regimens can often return to these following delivery.  -Patients who are breastfeeding should be advised to have small snacks before breastfeeding to reduce the risk of hypoglycemia.  -Patients should be referred to primary MD or Endocrinology for postpartum management.    The patient was advised to call for blood sugars less than 70 or greater than 200 prior to her next appointment. She was also advised that if she notes nausea/vomiting, inability to tolerate PO, or concerns about being able to take her insulin that she should contact her provider or present to the OB ED.      - Gastroesophageal reflux disease without esophagitis  She has been taking Pepcid for GERD in pregnancy. However, she states this medication is not adequately relieving her symptoms. She's requesting to try Omeprazole- Rx sent.    24- GERD improved.    - History of anencephaly in prior pregnancy, currently pregnant  With her most recent pregnancy, her baby was diagnosed with anencephaly antenatally. She, then, developed polyhydramnios and had a subsequent amnioreduction for symptomatic relief at 32 weeks. Following the procedure, she PPROM'ed and had NRFHTs. She had an elective primary  in an effort to hold her baby while it was still alive prior to passing.    The risk of spina bifida/ neural tube defect after a previous sibling is affected is estimated to be about 4%. Cranial vault appears normal today on ultrasound and we will continue to reassess.    We recommend 4mg of folic acid daily.    - BMI affecting pregnancy in third trimester  Please see prior documentation for specific  counseling.     BMI 42    Recommendations:  TWG goal is 11-20 lbs  Screen for signs/symptoms of obstructive sleep apnea  Nutritionist consult offered (this is to be ordered by primary OB provider)  Encouraged breastfeeding  Postpartum lifestyle modifications & weight loss      We have not scheduled any follow-up with MFM at this time, but would be happy to see her again should any questions, concerns, or issues arise.      Karyna Veloz MD  Maternal Fetal Medicine

## 2024-08-27 NOTE — ASSESSMENT & PLAN NOTE
"The patient has a history of a PE 1 wk postpartum after her most recent pregnancy in . She delivered  (anencephaly) at 32 weeks and developed a PE one week later. She was on Eliquis x 6 months. She requested evaluation with a  d/t anencephaly.  ordered thrombophilia workup due to recent PE and prothrombin gene mutation discovered. She was, then, referred to hematology.    She has been tested for inherited thrombophilia and antiphospholipid syndrome (APLS). The results of her testing are significant for prothrombin gene mutation (heterozygous). The patient was counseled on her history of VTE and the increased risk of VTE during pregnancy and the postpartum period.   She has been followed closely by her hematologist, Dr Morton at [facility]. She most recently had a visit on 24. She is currently taking Lovenox 60mg once daily. Recommendation "Consider increasing to therapeutic dosing during postpartum period if mobility is limited"    5/3/24- No changes from Hematology appt    24- She expressed interest in TOLAC given previous successful vaginal delivery in first pregnancy. If she has a successful vaginal delivery with her current pregnancy, ok to resume current Lovenox dose postpartum. If she has a  section, recommend increasing to therapeutic dose in postpartum period. She has a hematologist appt mid September (prior to delivery), appreciate recommendations.    Recommendations given her history:   1) Anticoagulation Recs   a) Prophylactic anticoagulation during the antepartum period/ prophylactic anticoagulation for 6 weeks postpartum. Per hematology, "Consider increasing to therapeutic dosing during postpartum period if mobility is limited"  -For patients with a history of PE, consider maternal echocardiogram to assess for right heart strain  b) Anticoagulation selection/dosing/monitoring during pregnancy  -We recommend use of LMWH/enoxaparin over unfractionated heparin " If the patient is unable to fill this medication, or if there is a high likelihood for need to reverse this medication, unfractionated heparin should be prescribed.  Prophylaxis dosing per hematology: Lovenox 60mg once daily  c) Peripartum Management  -ASHELY hose/SCDs should be used while anticoagulation is interrupted.  - If on prophylactic dose of LMWH/UFH, the last dose should be 12 hours prior to neuraxial anesthesia. For this reason, we no longer recommend conversion to UFH at 36 weeks.  d) Postpartum Management  -If on prophylactic dose and has a vaginal delivery WITHOUT epidural, anticoagulation should not be initiated any sooner than 6 hours postpartum.  -If on prophylactic dose and delivers WITH epidural or spinal (regardless of delivery type), anticoagulation should not be initiated any sooner than 12 hours postpartum and must be at least 4 hours since epidural removal (if had epidural).  -For patients at exceedingly high risk of VTE, this may need to be modified in consultation with MFM.  -Breastfeeding is compatible with warfarin, UFH, and LMWH.

## 2024-09-03 ENCOUNTER — PATIENT MESSAGE (OUTPATIENT)
Dept: MATERNAL FETAL MEDICINE | Facility: CLINIC | Age: 29
End: 2024-09-03
Payer: COMMERCIAL

## 2024-09-10 ENCOUNTER — PATIENT MESSAGE (OUTPATIENT)
Dept: MATERNAL FETAL MEDICINE | Facility: CLINIC | Age: 29
End: 2024-09-10
Payer: COMMERCIAL

## 2024-09-12 ENCOUNTER — HOSPITAL ENCOUNTER (EMERGENCY)
Facility: HOSPITAL | Age: 29
Discharge: HOME | End: 2024-09-12
Attending: SPECIALIST
Payer: COMMERCIAL

## 2024-09-12 ENCOUNTER — PATIENT MESSAGE (OUTPATIENT)
Dept: MATERNAL FETAL MEDICINE | Facility: CLINIC | Age: 29
End: 2024-09-12
Payer: COMMERCIAL

## 2024-09-12 VITALS
TEMPERATURE: 99 F | HEIGHT: 62 IN | SYSTOLIC BLOOD PRESSURE: 131 MMHG | HEART RATE: 87 BPM | BODY MASS INDEX: 42.14 KG/M2 | DIASTOLIC BLOOD PRESSURE: 68 MMHG | WEIGHT: 229 LBS

## 2024-09-12 PROCEDURE — 99284 EMERGENCY DEPT VISIT MOD MDM: CPT | Mod: 25

## 2024-09-12 NOTE — ED PROVIDER NOTES
"       NANCY NOTE     Admit Date: 2024  NANCY Physician: Jose Kim  Primary OBGYN: Dr. Sandoval    Chief Complaint   Patient presents with    Decreased Fetal Movement     Pt c/o decreased fetal movement. Pt states feeling movement but not as consistent as normal. Pt states also feeling contractions.       Hospital Course:  Rachel Gillespie is a 29 y.o.  at 35w2d presents complaining of feeling fetal movement but not as consistent this morning.  Patient also feels occasional uterine contractions but no specific pattern.    This IUP is complicated by history of  section.  Gestational diabetes requiring oral hypoglycemic drugs.  GERD.  History of pulmonary embolism complicating pregnancy.  History of gestational hypertension.  See problem list.    Patient denies vaginal bleeding, leakage of fluid, headache, vision changes, RUQ pain, dysuria, fever, and nausea/vomiting.  Fetal Movement: normal.      /68   Pulse 87   Temp 98.8 °F (37.1 °C)   Ht 5' 2" (1.575 m)   Wt 103.9 kg (229 lb)   LMP 2024 (Exact Date)   Breastfeeding No   BMI 41.88 kg/m²   Temp:  [98.8 °F (37.1 °C)] 98.8 °F (37.1 °C)  Pulse:  [87] 87  BP: (131)/(68) 131/68    General: in no apparent distress  Cardiovascular: regular rate and rhythm no murmurs  Respiratory: clear to auscultation, no wheezes, rales or rhonchi, symmetric air entry  Abdominal: FHT present  Back: lumbar tenderness present CVA tenderness none  Extremeties no redness or tenderness in the calves or thighs no edema    SSE:   SVE:  Long thick and closed      FHT: Category 1  TOCO:  Occasional mild contractions are noted.  No specific pattern.    Medical Decision Making:  A biophysical profile will be obtained for patient reassurance.  Patient will be kept on electronic fetal monitoring.  If all is reassuring patient will be discharged home.    LABS:   No results found for this or any previous visit (from the past 24 hour(s)).  [unfilled]     Imaging " Results    None      Biophysical profile     ASSESMENT: Rachel Gillespie is a 29 y.o.   at 35w2d with decreased fetal movement now resolved.    Discharge Diagnosis/Clinical Impression:  Pregnancy 35 weeks.  Decreased fetal movement.    Status:Improved/stable    Disposition:  discharged to home    Medications:       Patient Instructions:   Current Discharge Medication List        CONTINUE these medications which have NOT CHANGED    Details   acebutoloL (SECTRAL) 200 MG capsule Take 200 mg by mouth 2 (two) times daily.      aspirin (ECOTRIN) 81 MG EC tablet Take 2 tablets (162 mg total) by mouth once daily. Start at 12 weeks gestation.  At 36 weeks gestation, decrease to one 81mg tablet daily.  Qty: 60 tablet, Refills: 6      busPIRone (BUSPAR) 15 MG tablet Take 15 mg by mouth 3 (three) times daily.      enoxaparin (LOVENOX) 60 mg/0.6 mL Syrg       ferrous sulfate (FEOSOL) 325 mg (65 mg iron) Tab tablet 1 tablet Orally three times daily for 30 days      omeprazole (PRILOSEC) 40 MG capsule Take 1 capsule (40 mg total) by mouth once daily.  Qty: 90 capsule, Refills: 3    Associated Diagnoses: Gastroesophageal reflux during pregnancy in second trimester, antepartum      PNV no.143-iron-methylfolate 29-1 mg Tab Take 1 capsule by mouth Daily.  Qty: 30 tablet, Refills: 4    Associated Diagnoses: History of anencephaly in prior pregnancy, currently pregnant      sertraline (ZOLOFT) 50 MG tablet Take 50 mg by mouth once daily.      albuterol (PROVENTIL/VENTOLIN HFA) 90 mcg/actuation inhaler Inhale 2 puffs into the lungs every 6 (six) hours as needed.      ascorbic acid, vitamin C, (VITAMIN C) 1000 MG tablet Take 1,000 mg by mouth once daily.      blood sugar diagnostic (ONETOUCH VERIO TEST STRIPS) Strp CHECK FASTING BLOOD SUGAR EVERY MORNING AS WELL AS 1 HOUR AFTER EATING 3 TIMES/DAY.      blood-glucose meter (ONETOUCH VERIO REFLECT METER) Mis CHECK FASTING BLOOD SUGAR EVERY MORNING AS WELL AS 1 HOUR AFTER EATING 3  TIMES/DAY.  Qty: 1 each, Refills: 1    Associated Diagnoses: Gestational diabetes mellitus (GDM) in first trimester, gestational diabetes method of control unspecified      cholecalciferol, vitamin D3, (VITAMIN D3) 50 mcg (2,000 unit) Tab Take by mouth once daily.      fluorometholone 0.1% (FML) 0.1 % DrpS Apply 1 drop to eye 4 (four) times daily as needed.      hydrOXYzine HCL (ATARAX) 25 MG tablet Take 50 mg by mouth every 4 (four) hours as needed for Anxiety.      lancets (ONETOUCH DELICA PLUS LANCET) 33 gauge Misc Apply 1 lancet  topically 4 (four) times daily as needed.  Qty: 100 each, Refills: 3      metFORMIN (GLUCOPHAGE-XR) 500 MG ER 24hr tablet Take 1 tablet (500 mg total) by mouth every morning AND 2 tablets (1,000 mg total) every evening.  Qty: 180 tablet, Refills: 1      metoclopramide HCl (REGLAN) 10 MG tablet TAKE 1 TABLET (10 MG TOTAL) BY MOUTH EVERY MEAL AS NEEDED (TAKE 30 MIN PRIOR TO EACH MEAL AS NEEDED FOR PREVENTION OF NAUSEA/VOMITING).  Qty: 90 tablet, Refills: 1    Associated Diagnoses: Nausea and vomiting during pregnancy      ondansetron (ZOFRAN-ODT) 4 MG TbDL DISSOLVE ONE TABLET ON TONGUE EVERY 6 HOURS AS NEEDED FOR NAUSEA  Qty: 40 tablet, Refills: 1    Associated Diagnoses: Nausea and vomiting during pregnancy      promethazine (PHENERGAN) 25 MG suppository Place 1 suppository (25 mg total) rectally every 6 (six) hours as needed for Nausea.  Qty: 10 suppository, Refills: 0    Associated Diagnoses: Nausea and vomiting during pregnancy      scopolamine (TRANSDERM-SCOP) 1.3-1.5 mg (1 mg over 3 days) PLACE 1 PATCH ONTO THE SKIN EVERY 72 HOURS. FOR MANAGEMENT OF NAUSEA/VOMITING  Qty: 10 patch, Refills: 1    Comments: DX Code Needed  NEEDS REFILLS.  Associated Diagnoses: Nausea and vomiting during pregnancy      triamcinolone acetonide 0.025% (KENALOG) 0.025 % cream Apply 1 g topically 3 (three) times daily as needed.      zinc gluconate 50 mg tablet Take 50 mg by mouth once daily.             -  Pt was given routine pregnancy instructions including to return to triage if she had any vaginal bleeding (other than spotting for the next 48hrs), any loss of fluid like her water broke, decreased fetal movement, or contractions Q 5min lasting for 2 or more hours. Pt was also instructed to drink copious water. Patient voiced understanding of all these instructions and was subsequently discharged home.    She will follow up with her primary OB.    No discharge procedures on file.    Jose Kim MD  OB-GYN Hospitalist       Jose Kim MD  09/12/24 [id number]

## 2024-09-19 LAB — PRENATAL STREP B CULTURE: NEGATIVE

## 2024-09-29 ENCOUNTER — HOSPITAL ENCOUNTER (INPATIENT)
Facility: HOSPITAL | Age: 29
LOS: 4 days | Discharge: HOME | End: 2024-10-03
Attending: STUDENT IN AN ORGANIZED HEALTH CARE EDUCATION/TRAINING PROGRAM | Admitting: OBSTETRICS & GYNECOLOGY
Payer: COMMERCIAL

## 2024-09-29 DIAGNOSIS — O22.33 DEEP VEIN THROMBOSIS (DVT) AFFECTING PREGNANCY IN THIRD TRIMESTER: ICD-10-CM

## 2024-09-29 DIAGNOSIS — Z78.9 ADMITTED TO LABOR AND DELIVERY: Primary | ICD-10-CM

## 2024-09-29 LAB
BASOPHILS # BLD AUTO: 0.04 X10(3)/MCL
BASOPHILS NFR BLD AUTO: 0.3 %
CTP QC/QA: YES
EOSINOPHIL # BLD AUTO: 0.1 X10(3)/MCL (ref 0–0.9)
EOSINOPHIL NFR BLD AUTO: 0.7 %
ERYTHROCYTE [DISTWIDTH] IN BLOOD BY AUTOMATED COUNT: 15.5 % (ref 11.5–17)
HCT VFR BLD AUTO: 37.7 % (ref 37–47)
HGB BLD-MCNC: 12 G/DL (ref 12–16)
IMM GRANULOCYTES # BLD AUTO: 0.09 X10(3)/MCL (ref 0–0.04)
IMM GRANULOCYTES NFR BLD AUTO: 0.7 %
LYMPHOCYTES # BLD AUTO: 2.73 X10(3)/MCL (ref 0.6–4.6)
LYMPHOCYTES NFR BLD AUTO: 20.1 %
MCH RBC QN AUTO: 26.7 PG (ref 27–31)
MCHC RBC AUTO-ENTMCNC: 31.8 G/DL (ref 33–36)
MCV RBC AUTO: 84 FL (ref 80–94)
MONOCYTES # BLD AUTO: 0.88 X10(3)/MCL (ref 0.1–1.3)
MONOCYTES NFR BLD AUTO: 6.5 %
NEUTROPHILS # BLD AUTO: 9.77 X10(3)/MCL (ref 2.1–9.2)
NEUTROPHILS NFR BLD AUTO: 71.7 %
NRBC BLD AUTO-RTO: 0 %
PLATELET # BLD AUTO: 247 X10(3)/MCL (ref 130–400)
PMV BLD AUTO: 12 FL (ref 7.4–10.4)
RBC # BLD AUTO: 4.49 X10(6)/MCL (ref 4.2–5.4)
RUPTURE OF MEMBRANE: POSITIVE
WBC # BLD AUTO: 13.61 X10(3)/MCL (ref 4.5–11.5)

## 2024-09-29 PROCEDURE — 85025 COMPLETE CBC W/AUTO DIFF WBC: CPT | Performed by: STUDENT IN AN ORGANIZED HEALTH CARE EDUCATION/TRAINING PROGRAM

## 2024-09-29 PROCEDURE — 86850 RBC ANTIBODY SCREEN: CPT | Performed by: STUDENT IN AN ORGANIZED HEALTH CARE EDUCATION/TRAINING PROGRAM

## 2024-09-29 PROCEDURE — 86780 TREPONEMA PALLIDUM: CPT | Performed by: STUDENT IN AN ORGANIZED HEALTH CARE EDUCATION/TRAINING PROGRAM

## 2024-09-29 PROCEDURE — 86900 BLOOD TYPING SEROLOGIC ABO: CPT | Performed by: STUDENT IN AN ORGANIZED HEALTH CARE EDUCATION/TRAINING PROGRAM

## 2024-09-29 PROCEDURE — 11000001 HC ACUTE MED/SURG PRIVATE ROOM

## 2024-09-29 PROCEDURE — 99999 HC NO LEVEL OF SERVICE - ED ONLY: CPT

## 2024-09-29 PROCEDURE — 86901 BLOOD TYPING SEROLOGIC RH(D): CPT | Performed by: STUDENT IN AN ORGANIZED HEALTH CARE EDUCATION/TRAINING PROGRAM

## 2024-09-29 PROCEDURE — 80053 COMPREHEN METABOLIC PANEL: CPT | Performed by: STUDENT IN AN ORGANIZED HEALTH CARE EDUCATION/TRAINING PROGRAM

## 2024-09-29 PROCEDURE — 84112 EVAL AMNIOTIC FLUID PROTEIN: CPT

## 2024-09-29 PROCEDURE — 63600175 PHARM REV CODE 636 W HCPCS: Performed by: STUDENT IN AN ORGANIZED HEALTH CARE EDUCATION/TRAINING PROGRAM

## 2024-09-29 RX ORDER — ONDANSETRON 4 MG/1
8 TABLET, ORALLY DISINTEGRATING ORAL EVERY 8 HOURS PRN
Status: DISCONTINUED | OUTPATIENT
Start: 2024-09-29 | End: 2024-09-30 | Stop reason: SDUPTHER

## 2024-09-29 RX ORDER — MISOPROSTOL 100 UG/1
800 TABLET ORAL ONCE AS NEEDED
Status: DISCONTINUED | OUTPATIENT
Start: 2024-09-29 | End: 2024-09-30 | Stop reason: SDUPTHER

## 2024-09-29 RX ORDER — METHYLERGONOVINE MALEATE 0.2 MG/ML
200 INJECTION INTRAVENOUS ONCE AS NEEDED
Status: DISCONTINUED | OUTPATIENT
Start: 2024-09-29 | End: 2024-09-30 | Stop reason: SDUPTHER

## 2024-09-29 RX ORDER — SODIUM CHLORIDE 9 MG/ML
INJECTION, SOLUTION INTRAVENOUS
Status: DISCONTINUED | OUTPATIENT
Start: 2024-09-29 | End: 2024-09-30

## 2024-09-29 RX ORDER — OXYTOCIN-SODIUM CHLORIDE 0.9% IV SOLN 30 UNIT/500ML 30-0.9/5 UT/ML-%
10 SOLUTION INTRAVENOUS ONCE AS NEEDED
Status: DISCONTINUED | OUTPATIENT
Start: 2024-09-29 | End: 2024-09-30

## 2024-09-29 RX ORDER — LIDOCAINE HYDROCHLORIDE 10 MG/ML
10 INJECTION, SOLUTION INFILTRATION; PERINEURAL ONCE AS NEEDED
Status: DISCONTINUED | OUTPATIENT
Start: 2024-09-29 | End: 2024-09-30

## 2024-09-29 RX ORDER — DIPHENOXYLATE HYDROCHLORIDE AND ATROPINE SULFATE 2.5; .025 MG/1; MG/1
2 TABLET ORAL EVERY 6 HOURS PRN
Status: DISCONTINUED | OUTPATIENT
Start: 2024-09-29 | End: 2024-09-30 | Stop reason: SDUPTHER

## 2024-09-29 RX ORDER — SODIUM CHLORIDE, SODIUM LACTATE, POTASSIUM CHLORIDE, CALCIUM CHLORIDE 600; 310; 30; 20 MG/100ML; MG/100ML; MG/100ML; MG/100ML
INJECTION, SOLUTION INTRAVENOUS CONTINUOUS
Status: DISCONTINUED | OUTPATIENT
Start: 2024-09-29 | End: 2024-09-30

## 2024-09-29 RX ORDER — OXYTOCIN-SODIUM CHLORIDE 0.9% IV SOLN 30 UNIT/500ML 30-0.9/5 UT/ML-%
95 SOLUTION INTRAVENOUS ONCE AS NEEDED
Status: DISCONTINUED | OUTPATIENT
Start: 2024-09-29 | End: 2024-10-03 | Stop reason: HOSPADM

## 2024-09-29 RX ORDER — OXYTOCIN-SODIUM CHLORIDE 0.9% IV SOLN 30 UNIT/500ML 30-0.9/5 UT/ML-%
10 SOLUTION INTRAVENOUS ONCE AS NEEDED
Status: DISCONTINUED | OUTPATIENT
Start: 2024-09-29 | End: 2024-10-03 | Stop reason: HOSPADM

## 2024-09-29 RX ORDER — SIMETHICONE 80 MG
1 TABLET,CHEWABLE ORAL 4 TIMES DAILY PRN
Status: DISCONTINUED | OUTPATIENT
Start: 2024-09-29 | End: 2024-09-30 | Stop reason: SDUPTHER

## 2024-09-29 RX ORDER — CALCIUM CARBONATE 200(500)MG
500 TABLET,CHEWABLE ORAL 3 TIMES DAILY PRN
Status: DISCONTINUED | OUTPATIENT
Start: 2024-09-29 | End: 2024-10-03 | Stop reason: HOSPADM

## 2024-09-29 RX ORDER — ACETAMINOPHEN 325 MG/1
650 TABLET ORAL EVERY 6 HOURS PRN
Status: DISCONTINUED | OUTPATIENT
Start: 2024-09-29 | End: 2024-09-30

## 2024-09-29 RX ORDER — CARBOPROST TROMETHAMINE 250 UG/ML
250 INJECTION, SOLUTION INTRAMUSCULAR
Status: DISCONTINUED | OUTPATIENT
Start: 2024-09-29 | End: 2024-09-30 | Stop reason: SDUPTHER

## 2024-09-29 RX ORDER — BUTORPHANOL TARTRATE 2 MG/ML
2 INJECTION INTRAMUSCULAR; INTRAVENOUS
Status: DISCONTINUED | OUTPATIENT
Start: 2024-09-29 | End: 2024-09-30

## 2024-09-29 RX ORDER — ONDANSETRON HYDROCHLORIDE 2 MG/ML
4 INJECTION, SOLUTION INTRAVENOUS EVERY 6 HOURS PRN
Status: DISCONTINUED | OUTPATIENT
Start: 2024-09-29 | End: 2024-10-03 | Stop reason: HOSPADM

## 2024-09-29 RX ORDER — OXYTOCIN 10 [USP'U]/ML
10 INJECTION, SOLUTION INTRAMUSCULAR; INTRAVENOUS
Status: DISCONTINUED | OUTPATIENT
Start: 2024-09-29 | End: 2024-09-30

## 2024-09-29 RX ORDER — OXYTOCIN 10 [USP'U]/ML
10 INJECTION, SOLUTION INTRAMUSCULAR; INTRAVENOUS ONCE AS NEEDED
Status: DISCONTINUED | OUTPATIENT
Start: 2024-09-29 | End: 2024-09-30 | Stop reason: SDUPTHER

## 2024-09-29 RX ORDER — BUTORPHANOL TARTRATE 2 MG/ML
1 INJECTION INTRAMUSCULAR; INTRAVENOUS
Status: DISCONTINUED | OUTPATIENT
Start: 2024-09-29 | End: 2024-09-30

## 2024-09-29 RX ORDER — INSULIN GLARGINE 100 [IU]/ML
10 INJECTION, SOLUTION SUBCUTANEOUS 2 TIMES DAILY
COMMUNITY

## 2024-09-29 RX ORDER — OXYTOCIN-SODIUM CHLORIDE 0.9% IV SOLN 30 UNIT/500ML 30-0.9/5 UT/ML-%
95 SOLUTION INTRAVENOUS CONTINUOUS PRN
Status: DISCONTINUED | OUTPATIENT
Start: 2024-09-29 | End: 2024-09-30 | Stop reason: SDUPTHER

## 2024-09-29 RX ORDER — OXYTOCIN-SODIUM CHLORIDE 0.9% IV SOLN 30 UNIT/500ML 30-0.9/5 UT/ML-%
0-30 SOLUTION INTRAVENOUS CONTINUOUS
Status: DISCONTINUED | OUTPATIENT
Start: 2024-09-29 | End: 2024-09-30

## 2024-09-29 RX ADMIN — SODIUM CHLORIDE, POTASSIUM CHLORIDE, SODIUM LACTATE AND CALCIUM CHLORIDE: 600; 310; 30; 20 INJECTION, SOLUTION INTRAVENOUS at 11:09

## 2024-09-30 ENCOUNTER — ANESTHESIA EVENT (OUTPATIENT)
Dept: OBSTETRICS AND GYNECOLOGY | Facility: HOSPITAL | Age: 29
End: 2024-09-30
Payer: COMMERCIAL

## 2024-09-30 ENCOUNTER — ANESTHESIA (OUTPATIENT)
Dept: OBSTETRICS AND GYNECOLOGY | Facility: HOSPITAL | Age: 29
End: 2024-09-30
Payer: COMMERCIAL

## 2024-09-30 LAB
ABORH RETYPE: NORMAL
ALBUMIN SERPL-MCNC: 2.9 G/DL (ref 3.5–5)
ALBUMIN/GLOB SERPL: 0.8 RATIO (ref 1.1–2)
ALP SERPL-CCNC: 158 UNIT/L (ref 40–150)
ALT SERPL-CCNC: 11 UNIT/L (ref 0–55)
ANION GAP SERPL CALC-SCNC: 10 MEQ/L
AST SERPL-CCNC: 17 UNIT/L (ref 5–34)
BILIRUB SERPL-MCNC: 0.3 MG/DL
BUN SERPL-MCNC: 6.3 MG/DL (ref 7–18.7)
CALCIUM SERPL-MCNC: 9 MG/DL (ref 8.4–10.2)
CHLORIDE SERPL-SCNC: 108 MMOL/L (ref 98–107)
CO2 SERPL-SCNC: 19 MMOL/L (ref 22–29)
CREAT SERPL-MCNC: 0.65 MG/DL (ref 0.55–1.02)
CREAT/UREA NIT SERPL: 10
GFR SERPLBLD CREATININE-BSD FMLA CKD-EPI: >60 ML/MIN/1.73/M2
GLOBULIN SER-MCNC: 3.6 GM/DL (ref 2.4–3.5)
GLUCOSE SERPL-MCNC: 86 MG/DL (ref 74–100)
GROUP & RH: NORMAL
HBV SURFACE AG SERPL QL IA: NONREACTIVE
HCV AB SERPL QL IA: NONREACTIVE
INDIRECT COOMBS: NORMAL
POCT GLUCOSE: 55 MG/DL (ref 70–110)
POCT GLUCOSE: 65 MG/DL (ref 70–110)
POCT GLUCOSE: 68 MG/DL (ref 70–110)
POCT GLUCOSE: 72 MG/DL (ref 70–110)
POCT GLUCOSE: 73 MG/DL (ref 70–110)
POCT GLUCOSE: 75 MG/DL (ref 70–110)
POCT GLUCOSE: 90 MG/DL (ref 70–110)
POCT GLUCOSE: 91 MG/DL (ref 70–110)
POCT GLUCOSE: 93 MG/DL (ref 70–110)
POTASSIUM SERPL-SCNC: 4.2 MMOL/L (ref 3.5–5.1)
PROT SERPL-MCNC: 6.5 GM/DL (ref 6.4–8.3)
SODIUM SERPL-SCNC: 137 MMOL/L (ref 136–145)
SPECIMEN OUTDATE: NORMAL
T PALLIDUM AB SER QL: NONREACTIVE

## 2024-09-30 PROCEDURE — 63600175 PHARM REV CODE 636 W HCPCS: Performed by: NURSE ANESTHETIST, CERTIFIED REGISTERED

## 2024-09-30 PROCEDURE — 72100002 HC LABOR CARE, 1ST 8 HOURS

## 2024-09-30 PROCEDURE — 36004725 HC OB OR TIME LEV III - EA ADD 15 MIN: Performed by: STUDENT IN AN ORGANIZED HEALTH CARE EDUCATION/TRAINING PROGRAM

## 2024-09-30 PROCEDURE — 27201108 HC SURGICEL

## 2024-09-30 PROCEDURE — 72100003 HC LABOR CARE, EA. ADDL. 8 HRS

## 2024-09-30 PROCEDURE — 25000003 PHARM REV CODE 250: Performed by: STUDENT IN AN ORGANIZED HEALTH CARE EDUCATION/TRAINING PROGRAM

## 2024-09-30 PROCEDURE — 86923 COMPATIBILITY TEST ELECTRIC: CPT | Mod: 91 | Performed by: STUDENT IN AN ORGANIZED HEALTH CARE EDUCATION/TRAINING PROGRAM

## 2024-09-30 PROCEDURE — 37000009 HC ANESTHESIA EA ADD 15 MINS: Performed by: STUDENT IN AN ORGANIZED HEALTH CARE EDUCATION/TRAINING PROGRAM

## 2024-09-30 PROCEDURE — 71000039 HC RECOVERY, EACH ADD'L HOUR: Performed by: STUDENT IN AN ORGANIZED HEALTH CARE EDUCATION/TRAINING PROGRAM

## 2024-09-30 PROCEDURE — 63600175 PHARM REV CODE 636 W HCPCS: Mod: JZ,JG | Performed by: NURSE ANESTHETIST, CERTIFIED REGISTERED

## 2024-09-30 PROCEDURE — 25000003 PHARM REV CODE 250: Performed by: NURSE ANESTHETIST, CERTIFIED REGISTERED

## 2024-09-30 PROCEDURE — 86762 RUBELLA ANTIBODY: CPT | Performed by: STUDENT IN AN ORGANIZED HEALTH CARE EDUCATION/TRAINING PROGRAM

## 2024-09-30 PROCEDURE — 37000008 HC ANESTHESIA 1ST 15 MINUTES: Performed by: STUDENT IN AN ORGANIZED HEALTH CARE EDUCATION/TRAINING PROGRAM

## 2024-09-30 PROCEDURE — 59409 OBSTETRICAL CARE: CPT | Mod: ,,, | Performed by: NURSE ANESTHETIST, CERTIFIED REGISTERED

## 2024-09-30 PROCEDURE — 63600175 PHARM REV CODE 636 W HCPCS: Performed by: STUDENT IN AN ORGANIZED HEALTH CARE EDUCATION/TRAINING PROGRAM

## 2024-09-30 PROCEDURE — 87340 HEPATITIS B SURFACE AG IA: CPT | Performed by: STUDENT IN AN ORGANIZED HEALTH CARE EDUCATION/TRAINING PROGRAM

## 2024-09-30 PROCEDURE — 25000003 PHARM REV CODE 250

## 2024-09-30 PROCEDURE — 27201423 OPTIME MED/SURG SUP & DEVICES STERILE SUPPLY: Performed by: STUDENT IN AN ORGANIZED HEALTH CARE EDUCATION/TRAINING PROGRAM

## 2024-09-30 PROCEDURE — 27200918 HC ALEXIS O RING

## 2024-09-30 PROCEDURE — 27000181 HC CABLE, IUPC

## 2024-09-30 PROCEDURE — 01968 ANES/ANALG CS DLVR NEURAXIAL: CPT | Mod: ,,, | Performed by: NURSE ANESTHETIST, CERTIFIED REGISTERED

## 2024-09-30 PROCEDURE — 36004724 HC OB OR TIME LEV III - 1ST 15 MIN: Performed by: STUDENT IN AN ORGANIZED HEALTH CARE EDUCATION/TRAINING PROGRAM

## 2024-09-30 PROCEDURE — 71000033 HC RECOVERY, INTIAL HOUR: Performed by: STUDENT IN AN ORGANIZED HEALTH CARE EDUCATION/TRAINING PROGRAM

## 2024-09-30 PROCEDURE — 11000001 HC ACUTE MED/SURG PRIVATE ROOM

## 2024-09-30 PROCEDURE — 62326 NJX INTERLAMINAR LMBR/SAC: CPT | Performed by: NURSE ANESTHETIST, CERTIFIED REGISTERED

## 2024-09-30 PROCEDURE — 51702 INSERT TEMP BLADDER CATH: CPT

## 2024-09-30 PROCEDURE — 86803 HEPATITIS C AB TEST: CPT | Performed by: STUDENT IN AN ORGANIZED HEALTH CARE EDUCATION/TRAINING PROGRAM

## 2024-09-30 RX ORDER — CEFAZOLIN SODIUM 1 G/3ML
INJECTION, POWDER, FOR SOLUTION INTRAMUSCULAR; INTRAVENOUS
Status: DISCONTINUED | OUTPATIENT
Start: 2024-09-30 | End: 2024-09-30

## 2024-09-30 RX ORDER — LIDOCAINE HYDROCHLORIDE 20 MG/ML
INJECTION, SOLUTION EPIDURAL; INFILTRATION; INTRACAUDAL; PERINEURAL
Status: DISCONTINUED | OUTPATIENT
Start: 2024-09-30 | End: 2024-09-30

## 2024-09-30 RX ORDER — BISACODYL 10 MG/1
10 SUPPOSITORY RECTAL ONCE AS NEEDED
Status: DISCONTINUED | OUTPATIENT
Start: 2024-09-30 | End: 2024-10-03 | Stop reason: HOSPADM

## 2024-09-30 RX ORDER — DEXTROSE, SODIUM CHLORIDE, SODIUM LACTATE, POTASSIUM CHLORIDE, AND CALCIUM CHLORIDE 5; .6; .31; .03; .02 G/100ML; G/100ML; G/100ML; G/100ML; G/100ML
INJECTION, SOLUTION INTRAVENOUS CONTINUOUS
Status: DISCONTINUED | OUTPATIENT
Start: 2024-09-30 | End: 2024-09-30

## 2024-09-30 RX ORDER — MISOPROSTOL 100 UG/1
800 TABLET ORAL ONCE AS NEEDED
Status: DISCONTINUED | OUTPATIENT
Start: 2024-09-30 | End: 2024-10-03 | Stop reason: HOSPADM

## 2024-09-30 RX ORDER — OXYCODONE HYDROCHLORIDE 5 MG/1
10 TABLET ORAL EVERY 4 HOURS PRN
Status: DISCONTINUED | OUTPATIENT
Start: 2024-09-30 | End: 2024-10-02

## 2024-09-30 RX ORDER — OXYTOCIN-SODIUM CHLORIDE 0.9% IV SOLN 30 UNIT/500ML 30-0.9/5 UT/ML-%
95 SOLUTION INTRAVENOUS ONCE AS NEEDED
Status: DISCONTINUED | OUTPATIENT
Start: 2024-09-30 | End: 2024-09-30 | Stop reason: SDUPTHER

## 2024-09-30 RX ORDER — BUPIVACAINE HYDROCHLORIDE 2.5 MG/ML
INJECTION, SOLUTION EPIDURAL; INFILTRATION; INTRACAUDAL
Status: DISCONTINUED | OUTPATIENT
Start: 2024-09-30 | End: 2024-09-30

## 2024-09-30 RX ORDER — FAMOTIDINE 10 MG/ML
20 INJECTION INTRAVENOUS ONCE
Status: DISCONTINUED | OUTPATIENT
Start: 2024-09-30 | End: 2024-09-30

## 2024-09-30 RX ORDER — EPHEDRINE SULFATE 50 MG/ML
10 INJECTION, SOLUTION INTRAVENOUS ONCE AS NEEDED
Status: COMPLETED | OUTPATIENT
Start: 2024-09-30 | End: 2024-09-30

## 2024-09-30 RX ORDER — EPHEDRINE SULFATE 50 MG/ML
INJECTION, SOLUTION INTRAVENOUS
Status: COMPLETED
Start: 2024-09-30 | End: 2024-09-30

## 2024-09-30 RX ORDER — IBUPROFEN 800 MG/1
800 TABLET ORAL EVERY 8 HOURS
Status: DISCONTINUED | OUTPATIENT
Start: 2024-10-01 | End: 2024-10-03 | Stop reason: HOSPADM

## 2024-09-30 RX ORDER — MIDAZOLAM HYDROCHLORIDE 1 MG/ML
INJECTION INTRAMUSCULAR; INTRAVENOUS
Status: DISCONTINUED | OUTPATIENT
Start: 2024-09-30 | End: 2024-09-30

## 2024-09-30 RX ORDER — METHYLERGONOVINE MALEATE 0.2 MG/ML
200 INJECTION INTRAVENOUS ONCE AS NEEDED
Status: DISCONTINUED | OUTPATIENT
Start: 2024-09-30 | End: 2024-10-03 | Stop reason: HOSPADM

## 2024-09-30 RX ORDER — OXYTOCIN 10 [USP'U]/ML
10 INJECTION, SOLUTION INTRAMUSCULAR; INTRAVENOUS ONCE AS NEEDED
Status: DISCONTINUED | OUTPATIENT
Start: 2024-09-30 | End: 2024-10-03 | Stop reason: HOSPADM

## 2024-09-30 RX ORDER — DIPHENOXYLATE HYDROCHLORIDE AND ATROPINE SULFATE 2.5; .025 MG/1; MG/1
2 TABLET ORAL EVERY 6 HOURS PRN
Status: DISCONTINUED | OUTPATIENT
Start: 2024-09-30 | End: 2024-10-03 | Stop reason: HOSPADM

## 2024-09-30 RX ORDER — OXYTOCIN 10 [USP'U]/ML
INJECTION, SOLUTION INTRAMUSCULAR; INTRAVENOUS
Status: DISCONTINUED | OUTPATIENT
Start: 2024-09-30 | End: 2024-09-30

## 2024-09-30 RX ORDER — PRENATAL WITH FERROUS FUM AND FOLIC ACID 3080; 920; 120; 400; 22; 1.84; 3; 20; 10; 1; 12; 200; 27; 25; 2 [IU]/1; [IU]/1; MG/1; [IU]/1; MG/1; MG/1; MG/1; MG/1; MG/1; MG/1; UG/1; MG/1; MG/1; MG/1; MG/1
1 TABLET ORAL DAILY
Status: DISCONTINUED | OUTPATIENT
Start: 2024-10-01 | End: 2024-10-03 | Stop reason: HOSPADM

## 2024-09-30 RX ORDER — FENTANYL/BUPIVACAINE/NS/PF 2-1250MCG
PLASTIC BAG, INJECTION (ML) INJECTION CONTINUOUS PRN
Status: DISCONTINUED | OUTPATIENT
Start: 2024-09-30 | End: 2024-09-30

## 2024-09-30 RX ORDER — ONDANSETRON 4 MG/1
8 TABLET, ORALLY DISINTEGRATING ORAL EVERY 8 HOURS PRN
Status: DISCONTINUED | OUTPATIENT
Start: 2024-09-30 | End: 2024-10-03 | Stop reason: HOSPADM

## 2024-09-30 RX ORDER — CARBOPROST TROMETHAMINE 250 UG/ML
250 INJECTION, SOLUTION INTRAMUSCULAR
Status: DISCONTINUED | OUTPATIENT
Start: 2024-09-30 | End: 2024-10-03 | Stop reason: HOSPADM

## 2024-09-30 RX ORDER — DIPHENHYDRAMINE HYDROCHLORIDE 50 MG/ML
12.5 INJECTION INTRAMUSCULAR; INTRAVENOUS EVERY 4 HOURS PRN
Status: DISCONTINUED | OUTPATIENT
Start: 2024-09-30 | End: 2024-09-30

## 2024-09-30 RX ORDER — OXYCODONE HYDROCHLORIDE 5 MG/1
5 TABLET ORAL EVERY 4 HOURS PRN
Status: DISCONTINUED | OUTPATIENT
Start: 2024-09-30 | End: 2024-10-02

## 2024-09-30 RX ORDER — HYDROCODONE BITARTRATE AND ACETAMINOPHEN 500; 5 MG/1; MG/1
TABLET ORAL
Status: DISCONTINUED | OUTPATIENT
Start: 2024-09-30 | End: 2024-10-03 | Stop reason: HOSPADM

## 2024-09-30 RX ORDER — ADHESIVE BANDAGE
30 BANDAGE TOPICAL 2 TIMES DAILY PRN
Status: DISCONTINUED | OUTPATIENT
Start: 2024-10-01 | End: 2024-10-03 | Stop reason: HOSPADM

## 2024-09-30 RX ORDER — ENOXAPARIN SODIUM 150 MG/ML
1 INJECTION SUBCUTANEOUS
Status: DISCONTINUED | OUTPATIENT
Start: 2024-10-01 | End: 2024-10-03 | Stop reason: HOSPADM

## 2024-09-30 RX ORDER — MORPHINE SULFATE 4 MG/ML
4 INJECTION, SOLUTION INTRAMUSCULAR; INTRAVENOUS
Status: DISCONTINUED | OUTPATIENT
Start: 2024-09-30 | End: 2024-10-03 | Stop reason: HOSPADM

## 2024-09-30 RX ORDER — OXYTOCIN-SODIUM CHLORIDE 0.9% IV SOLN 30 UNIT/500ML 30-0.9/5 UT/ML-%
95 SOLUTION INTRAVENOUS CONTINUOUS PRN
Status: DISCONTINUED | OUTPATIENT
Start: 2024-09-30 | End: 2024-10-03 | Stop reason: HOSPADM

## 2024-09-30 RX ORDER — KETOROLAC TROMETHAMINE 30 MG/ML
30 INJECTION, SOLUTION INTRAMUSCULAR; INTRAVENOUS EVERY 8 HOURS
Status: COMPLETED | OUTPATIENT
Start: 2024-09-30 | End: 2024-10-01

## 2024-09-30 RX ORDER — AMOXICILLIN 250 MG
1 CAPSULE ORAL NIGHTLY PRN
Status: DISCONTINUED | OUTPATIENT
Start: 2024-09-30 | End: 2024-10-03 | Stop reason: HOSPADM

## 2024-09-30 RX ORDER — MORPHINE SULFATE 0.5 MG/ML
INJECTION, SOLUTION EPIDURAL; INTRATHECAL; INTRAVENOUS
Status: DISCONTINUED | OUTPATIENT
Start: 2024-09-30 | End: 2024-09-30

## 2024-09-30 RX ORDER — ONDANSETRON HYDROCHLORIDE 2 MG/ML
4 INJECTION, SOLUTION INTRAVENOUS ONCE
Status: DISCONTINUED | OUTPATIENT
Start: 2024-09-30 | End: 2024-09-30

## 2024-09-30 RX ORDER — FENTANYL CITRATE 50 UG/ML
INJECTION, SOLUTION INTRAMUSCULAR; INTRAVENOUS
Status: DISCONTINUED | OUTPATIENT
Start: 2024-09-30 | End: 2024-09-30

## 2024-09-30 RX ORDER — SIMETHICONE 80 MG
1 TABLET,CHEWABLE ORAL EVERY 6 HOURS PRN
Status: DISCONTINUED | OUTPATIENT
Start: 2024-09-30 | End: 2024-10-03 | Stop reason: HOSPADM

## 2024-09-30 RX ORDER — FENTANYL/BUPIVACAINE/NS/PF 2-1250MCG
PLASTIC BAG, INJECTION (ML) INJECTION CONTINUOUS
Status: DISCONTINUED | OUTPATIENT
Start: 2024-09-30 | End: 2024-09-30

## 2024-09-30 RX ORDER — DOCUSATE SODIUM 100 MG/1
200 CAPSULE, LIQUID FILLED ORAL 2 TIMES DAILY
Status: DISCONTINUED | OUTPATIENT
Start: 2024-09-30 | End: 2024-10-03 | Stop reason: HOSPADM

## 2024-09-30 RX ORDER — ACETAMINOPHEN 500 MG
1000 TABLET ORAL EVERY 6 HOURS
Status: DISCONTINUED | OUTPATIENT
Start: 2024-09-30 | End: 2024-10-02

## 2024-09-30 RX ORDER — DIPHENHYDRAMINE HCL 25 MG
25 CAPSULE ORAL EVERY 4 HOURS PRN
Status: DISCONTINUED | OUTPATIENT
Start: 2024-09-30 | End: 2024-10-03 | Stop reason: HOSPADM

## 2024-09-30 RX ADMIN — MORPHINE SULFATE 2 MG: 0.5 INJECTION, SOLUTION EPIDURAL; INTRATHECAL; INTRAVENOUS at 11:09

## 2024-09-30 RX ADMIN — MIDAZOLAM HYDROCHLORIDE 2 MG: 1 INJECTION, SOLUTION INTRAMUSCULAR; INTRAVENOUS at 10:09

## 2024-09-30 RX ADMIN — SODIUM CHLORIDE, POTASSIUM CHLORIDE, SODIUM LACTATE AND CALCIUM CHLORIDE: 600; 310; 30; 20 INJECTION, SOLUTION INTRAVENOUS at 11:09

## 2024-09-30 RX ADMIN — SODIUM CHLORIDE, POTASSIUM CHLORIDE, SODIUM LACTATE AND CALCIUM CHLORIDE: 600; 310; 30; 20 INJECTION, SOLUTION INTRAVENOUS at 08:09

## 2024-09-30 RX ADMIN — OXYTOCIN 95 MILLI-UNITS/MIN: 10 INJECTION, SOLUTION INTRAMUSCULAR; INTRAVENOUS at 11:09

## 2024-09-30 RX ADMIN — SODIUM CHLORIDE, POTASSIUM CHLORIDE, SODIUM LACTATE AND CALCIUM CHLORIDE 500 ML: 600; 310; 30; 20 INJECTION, SOLUTION INTRAVENOUS at 08:09

## 2024-09-30 RX ADMIN — ONDANSETRON 8 MG: 4 TABLET, ORALLY DISINTEGRATING ORAL at 06:09

## 2024-09-30 RX ADMIN — SODIUM CHLORIDE, POTASSIUM CHLORIDE, SODIUM LACTATE AND CALCIUM CHLORIDE 500 ML: 600; 310; 30; 20 INJECTION, SOLUTION INTRAVENOUS at 04:09

## 2024-09-30 RX ADMIN — OXYTOCIN 2 MILLI-UNITS/MIN: 10 INJECTION, SOLUTION INTRAMUSCULAR; INTRAVENOUS at 01:09

## 2024-09-30 RX ADMIN — AZITHROMYCIN MONOHYDRATE 500 MG: 500 INJECTION, POWDER, LYOPHILIZED, FOR SOLUTION INTRAVENOUS at 10:09

## 2024-09-30 RX ADMIN — MORPHINE SULFATE 3 MG: 0.5 INJECTION, SOLUTION EPIDURAL; INTRATHECAL; INTRAVENOUS at 11:09

## 2024-09-30 RX ADMIN — FENTANYL CITRATE 100 MCG: 50 INJECTION, SOLUTION INTRAMUSCULAR; INTRAVENOUS at 10:09

## 2024-09-30 RX ADMIN — BUPIVACAINE HYDROCHLORIDE 8 ML: 2.5 INJECTION, SOLUTION EPIDURAL; INFILTRATION; INTRACAUDAL; PERINEURAL at 05:09

## 2024-09-30 RX ADMIN — Medication 12 ML/HR: at 08:09

## 2024-09-30 RX ADMIN — ONDANSETRON 4 MG: 2 INJECTION INTRAMUSCULAR; INTRAVENOUS at 05:09

## 2024-09-30 RX ADMIN — Medication 12 ML/HR: at 12:09

## 2024-09-30 RX ADMIN — CALCIUM CARBONATE (ANTACID) CHEW TAB 500 MG 500 MG: 500 CHEW TAB at 05:09

## 2024-09-30 RX ADMIN — EPHEDRINE SULFATE 10 MG: 50 INJECTION INTRAVENOUS at 07:09

## 2024-09-30 RX ADMIN — LIDOCAINE HYDROCHLORIDE 10 ML: 20 INJECTION, SOLUTION EPIDURAL; INFILTRATION; INTRACAUDAL; PERINEURAL at 10:09

## 2024-09-30 RX ADMIN — EPHEDRINE SULFATE 10 MG: 50 INJECTION INTRAVENOUS at 06:09

## 2024-09-30 RX ADMIN — OXYTOCIN 30 UNITS: 10 INJECTION, SOLUTION INTRAMUSCULAR; INTRAVENOUS at 10:09

## 2024-09-30 RX ADMIN — SODIUM CHLORIDE, SODIUM LACTATE, POTASSIUM CHLORIDE, CALCIUM CHLORIDE AND DEXTROSE MONOHYDRATE: 5; 600; 310; 30; 20 INJECTION, SOLUTION INTRAVENOUS at 08:09

## 2024-09-30 RX ADMIN — CEFAZOLIN 2 G: 330 INJECTION, POWDER, FOR SOLUTION INTRAMUSCULAR; INTRAVENOUS at 10:09

## 2024-09-30 RX ADMIN — CALCIUM CARBONATE (ANTACID) CHEW TAB 500 MG 500 MG: 500 CHEW TAB at 06:09

## 2024-09-30 RX ADMIN — Medication 12 ML/HR: at 05:09

## 2024-09-30 NOTE — ANESTHESIA PREPROCEDURE EVALUATION
09/30/2024  Rachel Gillespie is a 29 y.o., female.    Complex hematologic history.  Briefly, pt with hx of heterozygous factor ii, discovered after w/u for PE following previous delivery.  Patient considered hypercoagulable from this mutation per her hematologist and was treated with ppx lovenox for portion of peripartum period.  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  Lovenox has been held for > 24 hours.  No clinical history of easy bruising or bleeding.  Patient would benefit from neuraxial as she is morbidly obese, for TOLAC today.  R/b/a discussed with the patient; she would like to proceed with labor epidural this morning.  Per [facility]IS recommendations, lovenox should not be restarted until 4hours after neuraxial catheter removal, and no earlier than 12 hours after needle/catheter placement.      Arturo GODINEZ     Pre-op Assessment    I have reviewed the Patient Summary Reports.     I have reviewed the Nursing Notes.    I have reviewed the Medications.     Review of Systems  Anesthesia Hx:               Denies Personal Hx of Anesthesia complications.                    Hematology/Oncology:                   Hematology Comments:   Factor ii mutation  On ppx lovenox dosing                    Cardiovascular:     Hypertension                                        Pulmonary:    Asthma    Sleep Apnea                Hepatic/GI:     GERD, well controlled             Endocrine:  Diabetes         Morbid Obesity / BMI > 40  Psych:  Psychiatric History anxiety depression                   Anesthesia Plan  Type of Anesthesia, risks & benefits discussed:    Anesthesia Type: Epidural  Intra-op Monitoring Plan: Standard ASA Monitors  Post Op Pain Control Plan: epidural analgesia  Induction:  IV  Informed  Consent: Informed consent signed with the Patient and all parties understand the risks and agree with anesthesia plan.  All questions answered.   ASA Score: 3  Day of Surgery Review of History & Physical: H&P Update referred to the surgeon/provider.    Ready For Surgery From Anesthesia Perspective.     .

## 2024-09-30 NOTE — PLAN OF CARE
Problem: Diabetes in Pregnancy  Goal: Optimal Coping  2024 by Cristy Cornejo RN  Outcome: Progressing  2024 by Cristy Cornejo RN  Outcome: Progressing  Goal: Blood Glucose Level Within Targeted Range  2024 by Cristy Cornejo RN  Outcome: Progressing  2024 by Cristy Cornejo RN  Outcome: Progressing     Problem:  Fall Injury Risk  Goal: Absence of Fall, Infant Drop and Related Injury  2024 by Cristy Cornejo RN  Outcome: Progressing  2024 by Cristy Cornejo RN  Outcome: Progressing     Problem: Infection  Goal: Absence of Infection Signs and Symptoms  2024 by Cristy Cornejo RN  Outcome: Progressing  2024 by Cristy Cornejo RN  Outcome: Progressing     Problem: Adult Inpatient Plan of Care  Goal: Plan of Care Review  2024 by Cristy Cornejo RN  Outcome: Progressing  2024 by Cristy Cornejo RN  Outcome: Progressing  Goal: Patient-Specific Goal (Individualized)  2024 by Cristy Cornejo RN  Outcome: Progressing  2024 by Cristy Cornejo RN  Outcome: Progressing  Goal: Absence of Hospital-Acquired Illness or Injury  2024 by Cristy Cornejo RN  Outcome: Progressing  2024 by Cristy Cornejo RN  Outcome: Progressing  Goal: Optimal Comfort and Wellbeing  2024 by Cristy Cornejo RN  Outcome: Progressing  2024 by Cristy Cornejo RN  Outcome: Progressing  Goal: Readiness for Transition of Care  2024 by Cristy Cornejo RN  Outcome: Progressing  2024 by Cristy Cornejo RN  Outcome: Progressing     Problem: Bariatric Environmental Safety  Goal: Safety Maintained with Care  2024 by Cristy Cornejo RN  Outcome: Progressing  2024 by Cristy Cornejo RN  Outcome: Progressing     Problem: Labor  Goal: Hemostasis  Outcome: Progressing  Goal: Stable Fetal Wellbeing  Outcome: Progressing  Goal: Effective Progression to  Delivery  Outcome: Progressing  Goal: Absence of Infection Signs and Symptoms  Outcome: Progressing  Goal: Acceptable Pain Control  Outcome: Progressing  Goal: Normal Uterine Contraction Pattern  Outcome: Progressing

## 2024-09-30 NOTE — H&P
L&D - History & Physical    Chief Complaint: SROM     HPI:      Rachel Gillespie is a 29 y.o.  at 37w6d who presents today for evaluation of above chief complaint.      Rupture of Membranes ( 37.5 prev c/s c/o leaking of clear fluid starting around 1930 with occasional contractions. Reports good fetal movement. Pt states she started having a headache on the way to hospital, but denies vision changes, SOB, chest pain, RUQ pain, N/V, and extremity swelling. )       Patient's last menstrual period was 2024 (exact date).     OB History    Para Term  AB Living   3 2 1 1   1   SAB IAB Ectopic Multiple Live Births           2      # Outcome Date GA Lbr Martin/2nd Weight Sex Type Anes PTL Lv   3 Current            2  22 32w0d  1.106 kg (2 lb 7 oz) M CS-Unspec  Y DEC      Birth Comments: neural tube defect,  shortly after birth; had poly and underwent amnioreduction then PPROM'ed and delivered      Complications: Anencephaly   1 Term 13 38w0d  3.005 kg (6 lb 10 oz) F Vag-Spont  N GISEL      Complications: Pre-eclampsia       Past Medical History:   Diagnosis Date    Anemia, unspecified     Anxiety and depression     Asthma     Blood clotting disorder     factor 2    Diabetic acetonemia     pre-diabetic    Gestational diabetes     High blood pressure     chronic    Insomnia     MTHFR gene mutation     Other pulmonary embolism without acute cor pulmonale     1 week PP    Pregnancy     Sleep apnea, unspecified     Supraventricular tachycardia     Thyroid goiter        Past Surgical History:   Procedure Laterality Date     SECTION  2022    TYMPANOSTOMY TUBE PLACEMENT      WISDOM TOOTH EXTRACTION         Family History   Problem Relation Name Age of Onset    Breast cancer Mother Yessica Marceloike         x2 (hormonal breast ca)    Hypertension Mother Yessica Marcelokie     Skin cancer Father Dipesh Mcnairjese     Hypertension Father Dipesh Lexis     Stroke Father  Dipesh Pires     Cancer Father Dipesh Pires         Unknown    Clotting disorder Paternal Aunt      Clotting disorder Paternal Uncle Alistair Pires         Factor 5    Diabetes Paternal Uncle Alistair Pires        Social History     Socioeconomic History    Marital status:    Tobacco Use    Smoking status: Former     Types: Vaping with nicotine     Passive exposure: Past    Smokeless tobacco: Former   Substance and Sexual Activity    Alcohol use: Not Currently    Drug use: Never    Sexual activity: Yes     Partners: Male     Birth control/protection: None     Social Drivers of Health     Financial Resource Strain: Low Risk  (9/29/2024)    Overall Financial Resource Strain (CARDIA)     Difficulty of Paying Living Expenses: Not very hard   Food Insecurity: No Food Insecurity (9/29/2024)    Hunger Vital Sign     Worried About Running Out of Food in the Last Year: Never true     Ran Out of Food in the Last Year: Never true   Physical Activity: Inactive (9/29/2024)    Exercise Vital Sign     Days of Exercise per Week: 0 days     Minutes of Exercise per Session: 0 min   Stress: No Stress Concern Present (9/29/2024)    [employer] [place] [employer] - Occupational Stress Questionnaire     Feeling of Stress : Only a little   Housing Stability: Unknown (9/29/2024)    Housing Stability Vital Sign     Unable to Pay for Housing in the Last Year: No       Current Facility-Administered Medications   Medication Dose Route Frequency Provider Last Rate Last Admin    ePHEDrine sulfate 50 mg/mL             0.9%  NaCl infusion   Intra-Catheter PRN Kenyon Sandoval MD        acetaminophen tablet 650 mg  650 mg Oral Q6H PRN Kenyon Sandoval MD        butorphanol injection 1 mg  1 mg Intravenous Q3H PRN Kenyon Sandoval MD        butorphanol injection 2 mg  2 mg Intravenous Q3H PRN Kenyon Sandoval MD        calcium carbonate 200 mg calcium (500 mg) chewable tablet 500 mg  500 mg Oral TID PRN Kenyon Sandoval MD    500 mg at 09/30/24 0616    carboprost injection 250 mcg  250 mcg Intramuscular Q15 Min PRN Kenyon Sandoval MD        diphenhydrAMINE injection 12.5 mg  12.5 mg Intravenous Q4H PRN Luke Morris MD        diphenoxylate-atropine 2.5-0.025 mg per tablet 2 tablet  2 tablet Oral Q6H PRN Kenyon Sandoval MD        famotidine (PF) injection 20 mg  20 mg Intravenous Once Luke Morris MD        fentaNYL 2 mcg/mL with BUPivacaine 0.125% in sodium chloride 0.9% Epidural   Epidural Continuous Luke Morris MD        lactated ringers bolus 1,000 mL  1,000 mL Intravenous PRN Kenyon Sandoval MD        lactated ringers bolus 1,000 mL  1,000 mL Intravenous Once Luke Morris MD        lactated ringers bolus 1,000 mL  1,000 mL Intravenous Once PRN Luke Morris MD        lactated ringers bolus 500 mL  500 mL Intravenous Once PRN Kenyon Sandoval MD        lactated ringers infusion   Intravenous Continuous Kenyon Sandoval  mL/hr at 09/29/24 2344 New Bag at 09/29/24 2344    LIDOcaine HCL 10 mg/ml (1%) injection 10 mL  10 mL Intradermal Once PRN Kenyon Sandoval MD        methylergonovine injection 200 mcg  200 mcg Intramuscular Once PRKenyon Kirkpatrick MD        miSOPROStoL tablet 800 mcg  800 mcg Rectal Once PRKenyon Kirkpatrick MD        miSOPROStoL tablet 800 mcg  800 mcg Oral Once PRKenyon Kirkpatrick MD        ondansetron disintegrating tablet 8 mg  8 mg Oral Q8H PRKenyon Kirkpatrick MD   8 mg at 09/30/24 0616    ondansetron injection 4 mg  4 mg Intravenous Q6H PRN Kenyon Sandoval MD        ondansetron injection 4 mg  4 mg Intravenous Once Luke Morris MD        oxytocin 30 units/500 mL (60 milliunits/mL) in 0.9% NaCl (non-titrating)  95 david-units/min Intravenous Continuous PRN Kenyon Sandoval MD        oxytocin 30 units/500 mL (60 milliunits/mL) in 0.9% NaCl (non-titrating)  95 david-units/min Intravenous Once PRN Kenyon Sandoval MD        oxytocin 30 units/500 mL (60 milliunits/mL) in 0.9% NaCl (TITRATING)  0-30  "david-units/min Intravenous Continuous Kenyon Sandoval MD 2 mL/hr at 09/30/24 0138 2 david-units/min at 09/30/24 0138    oxytocin 30 units/500 mL (60 milliunits/mL) in 0.9% NaCl IV bolus from bag  10 Units Intravenous Once PRN Kenyon Sandoval MD        oxytocin 30 units/500 mL (60 milliunits/mL) in 0.9% NaCl IV bolus from bag  10 Units Intravenous Once PRN Kenyon Sandoval MD        oxytocin injection 10 Units  10 Units Intramuscular PRN Kenyon Sandoval MD        oxytocin injection 10 Units  10 Units Intramuscular Once PRN Kenyon Sandoval MD        simethicone chewable tablet 80 mg  1 tablet Oral QID PRN Kenyon Sandoval MD        tranexamic acid (CYKLOKAPRON) 1,000 mg in 0.9% NaCl 100 mL IVPB (MB+)  1,000 mg Intravenous Q30 Min PRN Kenyon Sandoval MD         Facility-Administered Medications Ordered in Other Encounters   Medication Dose Route Frequency Provider Last Rate Last Admin    BUPivacaine (PF) 0.25% (2.5 mg/ml) injection   Intrapleural PRN Werner Farias CRNA   8 mL at 09/30/24 0530    fentaNYL 2 mcg/mL with BUPivacaine 0.125% in sodium chloride 0.9% Epidural   Epidural Continuous PRN Werner aFrias CRNA 12 mL/hr at 09/30/24 0530 12 mL/hr at 09/30/24 0530       Review of patient's allergies indicates:   Allergen Reactions    Latex Hives and Itching         Physical Exam:      /63   Pulse 79   Temp 98.6 °F (37 °C)   Resp 18   Ht 5' 2" (1.575 m)   Wt 103.9 kg (229 lb)   LMP 01/01/2024 (Exact Date)   SpO2 100%   Breastfeeding No   BMI 41.88 kg/m²   Body mass index is 41.88 kg/m².     APPEARANCE: Well nourished, well developed, in no acute distress.  PSYCH: Appropriate mood and affect.  CHEST: Normal respiratory effort,  CV: Normal capillary refill.  ABDOMEN: Soft.  No tenderness or masses.    PELVIC:  2/60/-3 at last RN exam  EXTREMITIES: No edema       Results:     Results for orders placed or performed during the hospital encounter of 09/29/24   POCT Rupture of membrane    Collection Time: " 09/29/24 10:28 PM   Result Value Ref Range    Rupture of Membrane Positive (A) Negative     Acceptable Yes    Comprehensive Metabolic Panel    Collection Time: 09/29/24 11:39 PM   Result Value Ref Range    Sodium 137 136 - 145 mmol/L    Potassium 4.2 3.5 - 5.1 mmol/L    Chloride 108 (H) 98 - 107 mmol/L    CO2 19 (L) 22 - 29 mmol/L    Glucose 86 74 - 100 mg/dL    Blood Urea Nitrogen 6.3 (L) 7.0 - 18.7 mg/dL    Creatinine 0.65 0.55 - 1.02 mg/dL    Calcium 9.0 8.4 - 10.2 mg/dL    Protein Total 6.5 6.4 - 8.3 gm/dL    Albumin 2.9 (L) 3.5 - 5.0 g/dL    Globulin 3.6 (H) 2.4 - 3.5 gm/dL    Albumin/Globulin Ratio 0.8 (L) 1.1 - 2.0 ratio    Bilirubin Total 0.3 <=1.5 mg/dL     (H) 40 - 150 unit/L    ALT 11 0 - 55 unit/L    AST 17 5 - 34 unit/L    eGFR >60 mL/min/1.73/m2    Anion Gap 10.0 mEq/L    BUN/Creatinine Ratio 10    SYPHILIS ANTIBODY (WITH REFLEX RPR)    Collection Time: 09/29/24 11:39 PM   Result Value Ref Range    Syphilis Antibody Nonreactive Nonreactive, Equivocal   CBC with Differential    Collection Time: 09/29/24 11:39 PM   Result Value Ref Range    WBC 13.61 (H) 4.50 - 11.50 x10(3)/mcL    RBC 4.49 4.20 - 5.40 x10(6)/mcL    Hgb 12.0 12.0 - 16.0 g/dL    Hct 37.7 37.0 - 47.0 %    MCV 84.0 80.0 - 94.0 fL    MCH 26.7 (L) 27.0 - 31.0 pg    MCHC 31.8 (L) 33.0 - 36.0 g/dL    RDW 15.5 11.5 - 17.0 %    Platelet 247 130 - 400 x10(3)/mcL    MPV 12.0 (H) 7.4 - 10.4 fL    Neut % 71.7 %    Lymph % 20.1 %    Mono % 6.5 %    Eos % 0.7 %    Basophil % 0.3 %    Lymph # 2.73 0.6 - 4.6 x10(3)/mcL    Neut # 9.77 (H) 2.1 - 9.2 x10(3)/mcL    Mono # 0.88 0.1 - 1.3 x10(3)/mcL    Eos # 0.10 0 - 0.9 x10(3)/mcL    Baso # 0.04 <=0.2 x10(3)/mcL    IG# 0.09 (H) 0 - 0.04 x10(3)/mcL    IG% 0.7 %    NRBC% 0.0 %   Type & Screen    Collection Time: 09/29/24 11:39 PM   Result Value Ref Range    Group & Rh B POS     Indirect Maddie GEL NEG     Specimen Outdate 10/02/2024 23:59    POCT glucose    Collection Time: 09/30/24 12:15  AM   Result Value Ref Range    POCT Glucose 90 70 - 110 mg/dL   POCT glucose    Collection Time: 24  2:18 AM   Result Value Ref Range    POCT Glucose 91 70 - 110 mg/dL         Assessment/Plan:     Active Problem List with Overview Notes    Diagnosis Date Noted    - BMI affecting pregnancy in third trimester 2024    - History of anencephaly in prior pregnancy, currently pregnant 2024    - Gestational diabetes mellitus (GDM) in third trimester controlled on oral hypoglycemic drug 2024    -Anxiety/depression affecting pregnancy in third trimester 2024    - Thyroid goiter 2024    BMI 45.0-49.9, adult 2024    - Gastroesophageal reflux disease without esophagitis 2024    - HTN in pregnancy, chronic 2024    Anxiety and depression 2024    AMEE (obstructive sleep apnea) 2024    Tachycardia 2024    History of  delivery 2024     Desires TOLAC, consented 3/26      - h/o PE complicating pregnancy 2024     Occurred 1w PP last pregnancy  Taking lovenox 60 qd with heme/onc        Last lovenox was   Continue IOL/TOLAC    Kenyon Sandoval MD  2024 6:54 AM

## 2024-09-30 NOTE — ANESTHESIA PROCEDURE NOTES
Epidural    Patient location during procedure: OB   Reason for block: primary anesthetic   Reason for block: labor analgesia requested by patient and obstetrician  Diagnosis: Labor Pain   Start time: 9/30/2024 5:23 AM  Timeout: 9/30/2024 5:22 AM  End time: 9/30/2024 5:32 AM  Surgery related to: Active Labor    Staffing  Performing Provider: Werner Farias CRNA  Authorizing Provider: Luke Morris MD    Staffing  Performed by: Werner Farias CRNA  Authorized by: Luke Morris MD        Preanesthetic Checklist  Completed: patient identified, IV checked, site marked, risks and benefits discussed, surgical consent, monitors and equipment checked, pre-op evaluation, timeout performed, anesthesia consent given, hand hygiene performed and patient being monitored  Preparation  Patient position: sitting  Prep: ChloraPrep  Patient monitoring: Pulse Ox and Blood Pressure  Reason for block: primary anesthetic   Epidural  Skin Anesthetic: lidocaine 1%  Skin Wheal: 3 mL  Administration type: continuous  Approach: midline  Interspace: L3-4    Injection technique: BARBARA saline  Needle and Epidural Catheter  Needle type: Tuohy   Needle gauge: 17  Needle length: 7.0 inches  Needle insertion depth: 5 cm  Catheter type: multi-orifice  Catheter size: 18 G  Catheter at skin depth: 12 cm  Insertion Attempts: 1  Test dose: 3 mL of lidocaine 1.5% with Epi 1-to-200,000  Additional Documentation: incremental injection, no paresthesia on injection, no significant pain on injection, negative aspiration for heme and CSF and no significant complaints from patient  Needle localization: anatomical landmarks  Assessment  Upper dermatomal levels - Left: T10  Right: T10   Dermatomal levels determined by alcohol wipe  Ease of block: easy  Patient's tolerance of the procedure: comfortable throughout block and no complaints  Additional Notes  Extensive collaboration with Dr. Morris concerning pt. Medical hx.  Specifically factor 2 def and PE hx.   Hematology note reviewed.  Ok to proceed with ZENA.  Easily inserted x1.  States comfortable. No inadvertent dural puncture with Tuohy.  Dural puncture not performed with spinal needle

## 2024-10-01 LAB
BASOPHILS # BLD AUTO: 0.09 X10(3)/MCL
BASOPHILS NFR BLD AUTO: 0.5 %
EOSINOPHIL # BLD AUTO: 0.04 X10(3)/MCL (ref 0–0.9)
EOSINOPHIL NFR BLD AUTO: 0.2 %
ERYTHROCYTE [DISTWIDTH] IN BLOOD BY AUTOMATED COUNT: 15.5 % (ref 11.5–17)
HCT VFR BLD AUTO: 32 % (ref 37–47)
HGB BLD-MCNC: 10 G/DL (ref 12–16)
IMM GRANULOCYTES # BLD AUTO: 0.14 X10(3)/MCL (ref 0–0.04)
IMM GRANULOCYTES NFR BLD AUTO: 0.7 %
LYMPHOCYTES # BLD AUTO: 1.33 X10(3)/MCL (ref 0.6–4.6)
LYMPHOCYTES NFR BLD AUTO: 6.8 %
MCH RBC QN AUTO: 26.7 PG (ref 27–31)
MCHC RBC AUTO-ENTMCNC: 31.3 G/DL (ref 33–36)
MCV RBC AUTO: 85.3 FL (ref 80–94)
MONOCYTES # BLD AUTO: 0.94 X10(3)/MCL (ref 0.1–1.3)
MONOCYTES NFR BLD AUTO: 4.8 %
NEUTROPHILS # BLD AUTO: 17.13 X10(3)/MCL (ref 2.1–9.2)
NEUTROPHILS NFR BLD AUTO: 87 %
NRBC BLD AUTO-RTO: 0 %
PLATELET # BLD AUTO: 177 X10(3)/MCL (ref 130–400)
PMV BLD AUTO: 11.2 FL (ref 7.4–10.4)
POCT GLUCOSE: 88 MG/DL (ref 70–110)
RBC # BLD AUTO: 3.75 X10(6)/MCL (ref 4.2–5.4)
RUBV IGG SERPL IA-ACNC: 1
RUBV IGG SERPL QL IA: POSITIVE
WBC # BLD AUTO: 19.67 X10(3)/MCL (ref 4.5–11.5)

## 2024-10-01 PROCEDURE — 63600175 PHARM REV CODE 636 W HCPCS: Performed by: STUDENT IN AN ORGANIZED HEALTH CARE EDUCATION/TRAINING PROGRAM

## 2024-10-01 PROCEDURE — 3E0234Z INTRODUCTION OF SERUM, TOXOID AND VACCINE INTO MUSCLE, PERCUTANEOUS APPROACH: ICD-10-PCS | Performed by: STUDENT IN AN ORGANIZED HEALTH CARE EDUCATION/TRAINING PROGRAM

## 2024-10-01 PROCEDURE — 85025 COMPLETE CBC W/AUTO DIFF WBC: CPT | Performed by: STUDENT IN AN ORGANIZED HEALTH CARE EDUCATION/TRAINING PROGRAM

## 2024-10-01 PROCEDURE — 25000003 PHARM REV CODE 250: Performed by: STUDENT IN AN ORGANIZED HEALTH CARE EDUCATION/TRAINING PROGRAM

## 2024-10-01 PROCEDURE — 36415 COLL VENOUS BLD VENIPUNCTURE: CPT | Performed by: STUDENT IN AN ORGANIZED HEALTH CARE EDUCATION/TRAINING PROGRAM

## 2024-10-01 PROCEDURE — 90471 IMMUNIZATION ADMIN: CPT | Performed by: STUDENT IN AN ORGANIZED HEALTH CARE EDUCATION/TRAINING PROGRAM

## 2024-10-01 PROCEDURE — 11000001 HC ACUTE MED/SURG PRIVATE ROOM

## 2024-10-01 PROCEDURE — 90715 TDAP VACCINE 7 YRS/> IM: CPT | Performed by: STUDENT IN AN ORGANIZED HEALTH CARE EDUCATION/TRAINING PROGRAM

## 2024-10-01 RX ADMIN — ENOXAPARIN SODIUM 105 MG: 120 INJECTION SUBCUTANEOUS at 08:10

## 2024-10-01 RX ADMIN — OXYCODONE HYDROCHLORIDE 10 MG: 5 TABLET ORAL at 11:10

## 2024-10-01 RX ADMIN — ENOXAPARIN SODIUM 105 MG: 120 INJECTION SUBCUTANEOUS at 10:10

## 2024-10-01 RX ADMIN — TETANUS TOXOID, REDUCED DIPHTHERIA TOXOID AND ACELLULAR PERTUSSIS VACCINE, ADSORBED 0.5 ML: 5; 2.5; 8; 8; 2.5 SUSPENSION INTRAMUSCULAR at 08:10

## 2024-10-01 RX ADMIN — KETOROLAC TROMETHAMINE 30 MG: 30 INJECTION, SOLUTION INTRAMUSCULAR at 01:10

## 2024-10-01 RX ADMIN — PRENATAL VITAMINS-IRON FUMARATE 27 MG IRON-FOLIC ACID 0.8 MG TABLET 1 TABLET: at 08:10

## 2024-10-01 RX ADMIN — DOCUSATE SODIUM 200 MG: 100 CAPSULE, LIQUID FILLED ORAL at 08:10

## 2024-10-01 RX ADMIN — OXYCODONE HYDROCHLORIDE 10 MG: 5 TABLET ORAL at 02:10

## 2024-10-01 RX ADMIN — OXYCODONE HYDROCHLORIDE 10 MG: 5 TABLET ORAL at 08:10

## 2024-10-01 RX ADMIN — KETOROLAC TROMETHAMINE 30 MG: 30 INJECTION, SOLUTION INTRAMUSCULAR at 10:10

## 2024-10-01 RX ADMIN — MORPHINE SULFATE 4 MG: 4 INJECTION, SOLUTION INTRAMUSCULAR; INTRAVENOUS at 12:10

## 2024-10-01 RX ADMIN — KETOROLAC TROMETHAMINE 30 MG: 30 INJECTION, SOLUTION INTRAMUSCULAR at 06:10

## 2024-10-01 RX ADMIN — DOCUSATE SODIUM 200 MG: 100 CAPSULE, LIQUID FILLED ORAL at 07:10

## 2024-10-01 NOTE — PROGRESS NOTES
"L&D progress note    Called in for decels.    Pt understands need for CS    BP (!) 100/51   Pulse 81   Temp 99.1 °F (37.3 °C)   Resp 18   Ht 5' 2" (1.575 m)   Wt 103.9 kg (229 lb)   LMP 2024 (Exact Date)   SpO2 99%   Breastfeeding No   BMI 41.88 kg/m²     NAD  Well perfused  Nonlabored breathing  NT ND  Calves nontender  SVE has been 4cm since 1700    EFM with recurrent lates, nonresponsive to RN interventions    A/P:    FITL    The  has been fully reviewed with the patient and written informed consent has been obtained.  Risks including, but not limited to, hemorrhage, need for blood transfusion (and its inherent risks of acquiring HIV, Hep B),  infection (>5% risk of skin infection with lower risk of deeper infection requiring further surgery), injury to surrounding structures (bowel/bladder/ureters/baby), risk of injury to pt have been presented.  The patient wishes to proceed.  All questions were answered.      Kenyon Sandoval MD  2024     "

## 2024-10-01 NOTE — ANESTHESIA POSTPROCEDURE EVALUATION
Anesthesia Post Evaluation    Patient: Rachel Gillespie    Procedure(s) Performed: * No procedures listed *    Final Anesthesia Type: regional (Regional comprises either epidural or spinal)      Patient location during evaluation: labor & delivery  Patient participation: Yes- Able to Participate  Post-procedure vital signs: reviewed and stable (No complaints at this time)  Pain management: adequate      Anesthetic complications: no              No complaints at this time.        Vitals Value Taken Time   /74 09/30/24 2335   Temp 37.2 °C (98.9 °F) 09/30/24 2320   Pulse 124 09/30/24 2335   Resp 16 09/30/24 2335   SpO2 99 % 09/30/24 2335         No case tracking events are documented in the log.      Pain/Swati Score: Swati Score: 9 (9/30/2024 11:18 PM)

## 2024-10-01 NOTE — TRANSFER OF CARE
"Anesthesia Transfer of Care Note    Patient: Rachel Gillespie    Procedure(s) Performed: * No procedures listed *    Patient location: Labor and Delivery    Anesthesia Type: epidural    Transport from OR: Transported from OR on room air with adequate spontaneous ventilation    Post pain: adequate analgesia    Post assessment: no apparent anesthetic complications    Post vital signs: stable    Level of consciousness: awake, alert and oriented    Nausea/Vomiting: no nausea/vomiting    Complications: none    Transfer of care protocol was followed      Last vitals: Visit Vitals  BP (!) 100/51   Pulse 81   Temp 37.3 °C (99.1 °F)   Resp 18   Ht 5' 2" (1.575 m)   Wt 103.9 kg (229 lb)   LMP 01/01/2024 (Exact Date)   SpO2 99%   Breastfeeding No   BMI 41.88 kg/m²     "

## 2024-10-01 NOTE — PROGRESS NOTES
PostPartum Progress Note        Subjective:      Postpartum Day #1 after LTCS  .  Patient is without complaints. Lochia decreasing, less than menses.  Pain is well controlled. Patient is ambulating without lightheadedness. Passing flatus. Tolerating regular diet.    Objective:      Temp:  [98 °F (36.7 °C)-100 °F (37.8 °C)] 98.8 °F (37.1 °C)  Pulse:  [] 96  Resp:  [16-18] 18  SpO2:  [88 %-100 %] 99 %  BP: ()/(47-89) 111/68    Intake/Output Summary (Last 24 hours) at 10/1/2024 1019  Last data filed at 10/1/2024 0009  Gross per 24 hour   Intake 2305.59 ml   Output 3050 ml   Net -744.41 ml     Body mass index is 41.88 kg/m².    General: no acute distress  Abdomen: soft, appropriately tender, LTCS incision with bandage in place  Extremities: non-tender, symmetric    Group & Rh   Date Value Ref Range Status   2024 B POS  Final     Recent Results (from the past 2 weeks)   CBC with Differential    Collection Time: 10/01/24  7:26 AM   Result Value Ref Range    WBC 19.67 (H) 4.50 - 11.50 x10(3)/mcL    Hgb 10.0 (L) 12.0 - 16.0 g/dL    Hct 32.0 (L) 37.0 - 47.0 %    Platelet 177 130 - 400 x10(3)/mcL   CBC with Differential    Collection Time: 24 11:39 PM   Result Value Ref Range    WBC 13.61 (H) 4.50 - 11.50 x10(3)/mcL    Hgb 12.0 12.0 - 16.0 g/dL    Hct 37.7 37.0 - 47.0 %    Platelet 247 130 - 400 x10(3)/mcL          Assessment/Plan     29 y.o.  S/P , PPD # 1 - Doing appropriately   -Continue routine postpartum care  -Anticipated d/c POD#2-4    Active Problem List with Overview Notes    Diagnosis Date Noted    - BMI affecting pregnancy in third trimester 2024    - History of anencephaly in prior pregnancy, currently pregnant 2024    - Gestational diabetes mellitus (GDM) in third trimester controlled on oral hypoglycemic drug 2024    -Anxiety/depression affecting pregnancy in third trimester 2024    - Thyroid goiter 2024    BMI 45.0-49.9, adult 2024     - Gastroesophageal reflux disease without esophagitis 2024    - HTN in pregnancy, chronic 2024    Anxiety and depression 2024    AMEE (obstructive sleep apnea) 2024    Tachycardia 2024    History of  delivery 2024     Desires TOLAC, consented 3/26      - h/o PE complicating pregnancy 2024     Occurred 1w PP last pregnancy  Taking lovenox 60 qd with heme/onc       Lovenox 1mg/kg BID PP      Kenyon Sandoval MD  10/01/2024 10:19 AM

## 2024-10-01 NOTE — PLAN OF CARE
Problem: Diabetes in Pregnancy  Goal: Optimal Coping  Outcome: Progressing  Goal: Blood Glucose Level Within Targeted Range  Outcome: Progressing     Problem:  Fall Injury Risk  Goal: Absence of Fall, Infant Drop and Related Injury  Outcome: Progressing     Problem: Infection  Goal: Absence of Infection Signs and Symptoms  Outcome: Progressing     Problem: Adult Inpatient Plan of Care  Goal: Plan of Care Review  Outcome: Progressing  Goal: Patient-Specific Goal (Individualized)  Outcome: Progressing  Goal: Absence of Hospital-Acquired Illness or Injury  Outcome: Progressing  Goal: Optimal Comfort and Wellbeing  Outcome: Progressing  Goal: Readiness for Transition of Care  Outcome: Progressing     Problem: Bariatric Environmental Safety  Goal: Safety Maintained with Care  Outcome: Progressing     Problem: Labor  Goal: Hemostasis  Outcome: Progressing  Goal: Stable Fetal Wellbeing  Outcome: Progressing  Goal: Effective Progression to Delivery  Outcome: Progressing  Goal: Absence of Infection Signs and Symptoms  Outcome: Progressing  Goal: Acceptable Pain Control  Outcome: Progressing  Goal: Normal Uterine Contraction Pattern  Outcome: Progressing     Problem: Postpartum ( Delivery)  Goal: Successful Parent Role Transition  Outcome: Progressing  Goal: Hemostasis  Outcome: Progressing  Goal: Effective Bowel Elimination  Outcome: Progressing  Goal: Fluid and Electrolyte Balance  Outcome: Progressing  Goal: Absence of Infection Signs and Symptoms  Outcome: Progressing  Goal: Anesthesia/Sedation Recovery  Outcome: Progressing  Goal: Optimal Pain Control and Function  Outcome: Progressing  Goal: Nausea and Vomiting Relief  Outcome: Progressing  Goal: Effective Urinary Elimination  Outcome: Progressing  Goal: Effective Oxygenation and Ventilation  Outcome: Progressing

## 2024-10-01 NOTE — OP NOTE
2024  11:15 PM     Section Operative Report    Indications: FITL, h/o CSx1    Pre-operative Diagnosis: 29 y.o. year old female at 37w6d IUP, indications as above,   Patient Active Problem List   Diagnosis    - h/o PE complicating pregnancy    BMI 45.0-49.9, adult    - Gastroesophageal reflux disease without esophagitis    - HTN in pregnancy, chronic    Anxiety and depression    AMEE (obstructive sleep apnea)    Tachycardia    History of  delivery    - BMI affecting pregnancy in third trimester    - History of anencephaly in prior pregnancy, currently pregnant    - Gestational diabetes mellitus (GDM) in third trimester controlled on oral hypoglycemic drug    -Anxiety/depression affecting pregnancy in third trimester    - Thyroid goiter       Post-operative Diagnosis: same    Procedure: LTCS    Surgeon: Kenyon Sandoval MD    Assistants:  no assistants were immediately available    Anesthesia: epidural    Antibiotics: Ancef 2 gm IV, Azithromycin 500 mg IV    Complications: none    Findings:  Normal appearing gravid uterus, tubes and ovaries.  Moderate fascial adhesive disease. Mild intra-abdominal adhesive disease    Measured Blood Loss: 600 mL           Drains: Henson catheter    Procedure:  The patient was taken to the operating room after informed consent was given with PCEA already in place from labor room. After induction of  anesthesia, patient was placed in dorsal supine position with left lateral tilt. The patient was prepped and draped in the usual sterile manner. A time out was held and the patient's name and the procedure was confirmed. Adequacy of anesthesia was confirmed.     A Ezekiel-Sanford incision was made and carried down through the subcutaneous tissue to the fascia. Small bilateral incisions on the fascia were made using the scalpel and the fascial incision was extended laterally with manual traction cephalad and caudad. Midline of the rectus muscles were identified and the  peritoneum entered bluntly and the muscles  laterally manually. Peritoneal incision was extended longitudinally with manual traction. Goldy was inserted. The lower uterine segment was identified.    A low transverse uterine incision was made. The infant delivered from a vertex presentation. After the umbilical cord was doubly clamped and cut, cord blood was obtained for evaluation. The placenta was removed intact and appeared normal.  The uterus was exteriorized and cleaned of all clots and debris.  The uterine outline, tubes and ovaries appeared normal. The uterine incision was closed with running unlocked sutures of 0-vicryl. An imbricating layer was not required. The uterus was internalized. Hemostasis was observed and surgicel powder placed to ensure. The fascia was then reapproximated with running sutures of 0-vicryl. Subcutaneous adipose tissue was reapproximated and closed with running sutures of 0-vicryl. The skin was reapproximated with insorb.    Instrument, sponge, and needle counts were correct prior the abdominal closure and at the conclusion of the case.  Patient was taken to the recovery room in a stable condition.     Kenyon Sandoval MD 09/30/2024 11:15 PM

## 2024-10-02 LAB
ABO + RH BLD: NORMAL
ABO + RH BLD: NORMAL
BLD PROD TYP BPU: NORMAL
BLD PROD TYP BPU: NORMAL
BLOOD UNIT EXPIRATION DATE: NORMAL
BLOOD UNIT EXPIRATION DATE: NORMAL
BLOOD UNIT TYPE CODE: 7300
BLOOD UNIT TYPE CODE: 7300
CROSSMATCH INTERPRETATION: NORMAL
CROSSMATCH INTERPRETATION: NORMAL
DISPENSE STATUS: NORMAL
DISPENSE STATUS: NORMAL
UNIT NUMBER: NORMAL
UNIT NUMBER: NORMAL

## 2024-10-02 PROCEDURE — 25000003 PHARM REV CODE 250: Performed by: STUDENT IN AN ORGANIZED HEALTH CARE EDUCATION/TRAINING PROGRAM

## 2024-10-02 PROCEDURE — 63600175 PHARM REV CODE 636 W HCPCS: Performed by: STUDENT IN AN ORGANIZED HEALTH CARE EDUCATION/TRAINING PROGRAM

## 2024-10-02 PROCEDURE — 11000001 HC ACUTE MED/SURG PRIVATE ROOM

## 2024-10-02 RX ORDER — METOPROLOL TARTRATE 25 MG/1
50 TABLET, FILM COATED ORAL NIGHTLY
Status: DISCONTINUED | OUTPATIENT
Start: 2024-10-02 | End: 2024-10-03 | Stop reason: HOSPADM

## 2024-10-02 RX ORDER — BUSPIRONE HYDROCHLORIDE 5 MG/1
15 TABLET ORAL NIGHTLY
Status: DISCONTINUED | OUTPATIENT
Start: 2024-10-02 | End: 2024-10-03 | Stop reason: HOSPADM

## 2024-10-02 RX ORDER — SERTRALINE HYDROCHLORIDE 50 MG/1
50 TABLET, FILM COATED ORAL DAILY
Status: DISCONTINUED | OUTPATIENT
Start: 2024-10-02 | End: 2024-10-03 | Stop reason: HOSPADM

## 2024-10-02 RX ORDER — OXYCODONE AND ACETAMINOPHEN 10; 325 MG/1; MG/1
1 TABLET ORAL EVERY 4 HOURS PRN
Status: DISCONTINUED | OUTPATIENT
Start: 2024-10-02 | End: 2024-10-03 | Stop reason: HOSPADM

## 2024-10-02 RX ORDER — OXYCODONE AND ACETAMINOPHEN 5; 325 MG/1; MG/1
1 TABLET ORAL EVERY 4 HOURS PRN
Status: DISCONTINUED | OUTPATIENT
Start: 2024-10-02 | End: 2024-10-03 | Stop reason: HOSPADM

## 2024-10-02 RX ADMIN — SIMETHICONE 80 MG: 80 TABLET, CHEWABLE ORAL at 12:10

## 2024-10-02 RX ADMIN — METOPROLOL TARTRATE 50 MG: 25 TABLET, FILM COATED ORAL at 08:10

## 2024-10-02 RX ADMIN — SERTRALINE HYDROCHLORIDE 50 MG: 50 TABLET ORAL at 12:10

## 2024-10-02 RX ADMIN — IBUPROFEN 800 MG: 800 TABLET, FILM COATED ORAL at 08:10

## 2024-10-02 RX ADMIN — OXYCODONE AND ACETAMINOPHEN 1 TABLET: 10; 325 TABLET ORAL at 11:10

## 2024-10-02 RX ADMIN — OXYCODONE HYDROCHLORIDE 10 MG: 5 TABLET ORAL at 03:10

## 2024-10-02 RX ADMIN — IBUPROFEN 800 MG: 800 TABLET, FILM COATED ORAL at 01:10

## 2024-10-02 RX ADMIN — SENNOSIDES AND DOCUSATE SODIUM 1 TABLET: 50; 8.6 TABLET ORAL at 08:10

## 2024-10-02 RX ADMIN — DOCUSATE SODIUM 200 MG: 100 CAPSULE, LIQUID FILLED ORAL at 07:10

## 2024-10-02 RX ADMIN — IBUPROFEN 800 MG: 800 TABLET, FILM COATED ORAL at 12:10

## 2024-10-02 RX ADMIN — ONDANSETRON 8 MG: 4 TABLET, ORALLY DISINTEGRATING ORAL at 12:10

## 2024-10-02 RX ADMIN — OXYCODONE AND ACETAMINOPHEN 1 TABLET: 10; 325 TABLET ORAL at 10:10

## 2024-10-02 RX ADMIN — OXYCODONE AND ACETAMINOPHEN 1 TABLET: 10; 325 TABLET ORAL at 07:10

## 2024-10-02 RX ADMIN — OXYCODONE AND ACETAMINOPHEN 1 TABLET: 10; 325 TABLET ORAL at 02:10

## 2024-10-02 RX ADMIN — BUSPIRONE HYDROCHLORIDE 15 MG: 5 TABLET ORAL at 08:10

## 2024-10-02 RX ADMIN — ENOXAPARIN SODIUM 105 MG: 120 INJECTION SUBCUTANEOUS at 07:10

## 2024-10-02 RX ADMIN — ENOXAPARIN SODIUM 105 MG: 120 INJECTION SUBCUTANEOUS at 09:10

## 2024-10-02 RX ADMIN — DOCUSATE SODIUM 200 MG: 100 CAPSULE, LIQUID FILLED ORAL at 08:10

## 2024-10-02 RX ADMIN — SIMETHICONE 80 MG: 80 TABLET, CHEWABLE ORAL at 07:10

## 2024-10-02 RX ADMIN — PRENATAL VITAMINS-IRON FUMARATE 27 MG IRON-FOLIC ACID 0.8 MG TABLET 1 TABLET: at 07:10

## 2024-10-02 NOTE — PLAN OF CARE
Problem: Diabetes in Pregnancy  Goal: Optimal Coping  Outcome: Progressing  Goal: Blood Glucose Level Within Targeted Range  Outcome: Progressing     Problem:  Fall Injury Risk  Goal: Absence of Fall, Infant Drop and Related Injury  Outcome: Progressing     Problem: Infection  Goal: Absence of Infection Signs and Symptoms  Outcome: Progressing     Problem: Adult Inpatient Plan of Care  Goal: Plan of Care Review  Outcome: Progressing  Goal: Patient-Specific Goal (Individualized)  Outcome: Progressing  Goal: Absence of Hospital-Acquired Illness or Injury  Outcome: Progressing  Goal: Optimal Comfort and Wellbeing  Outcome: Progressing  Goal: Readiness for Transition of Care  Outcome: Progressing     Problem: Bariatric Environmental Safety  Goal: Safety Maintained with Care  Outcome: Progressing     Problem: Postpartum ( Delivery)  Goal: Successful Parent Role Transition  Outcome: Progressing  Goal: Hemostasis  Outcome: Progressing  Goal: Effective Bowel Elimination  Outcome: Progressing  Goal: Fluid and Electrolyte Balance  Outcome: Progressing  Goal: Absence of Infection Signs and Symptoms  Outcome: Progressing  Goal: Anesthesia/Sedation Recovery  Outcome: Progressing  Goal: Optimal Pain Control and Function  Outcome: Progressing  Goal: Nausea and Vomiting Relief  Outcome: Progressing  Goal: Effective Urinary Elimination  Outcome: Progressing  Goal: Effective Oxygenation and Ventilation  Outcome: Progressing

## 2024-10-02 NOTE — LACTATION NOTE
This note was copied from a baby's chart.  Assisted with position and latch. Baby very sleepy. Requires constant stimulation at the breast. A few swallows heard, total. Not many. Attempted to hand express both breasts. Unable to collect any colostrum. Educated mom on basics of breastfeeding, pumping if baby isnt latching d/t sleepiness. Baby yellow. Discussed donor milk vs formula for supplementation. Mom chose formula. Supplemented with formula after 10 min on each breast of minimal suckling. Baby a lot more alert after feeding. Encouraged mom to breastfeed first, then supplement after. She voiced understanding.

## 2024-10-02 NOTE — PLAN OF CARE
Problem: Diabetes in Pregnancy  Goal: Optimal Coping  10/1/2024 2156 by Ayesha Ang RN  Outcome: Progressing  10/1/2024 2156 by Ayesha Ang RN  Outcome: Progressing  Goal: Blood Glucose Level Within Targeted Range  10/1/2024 2156 by Ayesha Ang RN  Outcome: Progressing  10/1/2024 2156 by Ayesha Ang RN  Outcome: Progressing     Problem:  Fall Injury Risk  Goal: Absence of Fall, Infant Drop and Related Injury  10/1/2024 2156 by Ayesha Ang RN  Outcome: Progressing  10/1/2024 2156 by Ayseha Ang RN  Outcome: Progressing     Problem: Infection  Goal: Absence of Infection Signs and Symptoms  10/1/2024 2156 by Ayesha Ang RN  Outcome: Progressing  10/1/2024 2156 by Ayesha Ang RN  Outcome: Progressing     Problem: Adult Inpatient Plan of Care  Goal: Plan of Care Review  10/1/2024 2156 by Ayesha Ang RN  Outcome: Progressing  10/1/2024 2156 by Ayesha Ang RN  Outcome: Progressing  Goal: Patient-Specific Goal (Individualized)  10/1/2024 2156 by Ayesha Ang RN  Outcome: Progressing  Flowsheets (Taken 10/1/2024 2156)  Patient/Family-Specific Goals (Include Timeframe): Get some rest tonight  10/1/2024 2156 by Ayesha Ang RN  Outcome: Progressing  Goal: Absence of Hospital-Acquired Illness or Injury  10/1/2024 2156 by Ayesha Ang RN  Outcome: Progressing  10/1/2024 2156 by Ayesha Ang RN  Outcome: Progressing  Goal: Optimal Comfort and Wellbeing  10/1/2024 2156 by Ayesha Ang RN  Outcome: Progressing  10/1/2024 2156 by Ayesha Ang RN  Outcome: Progressing  Goal: Readiness for Transition of Care  10/1/2024 2156 by Ayesha Ang RN  Outcome: Progressing  10/1/2024 2156 by Ayesha Ang RN  Outcome: Progressing     Problem: Bariatric Environmental Safety  Goal: Safety Maintained with Care  10/1/2024 2156 by Ayesha Ang, RN  Outcome: Progressing  10/1/2024 2156 by Ayesha Ang,  RN  Outcome: Progressing     Problem: Labor  Goal: Hemostasis  10/1/2024 2156 by Ayesha Ang RN  Outcome: Progressing  10/1/2024 2156 by Ayesha Ang RN  Outcome: Progressing  Goal: Stable Fetal Wellbeing  10/1/2024 2156 by Ayesha Ang RN  Outcome: Progressing  10/1/2024 2156 by Ayesha Ang RN  Outcome: Progressing  Goal: Effective Progression to Delivery  10/1/2024 2156 by Ayesha Ang RN  Outcome: Progressing  10/1/2024 2156 by Ayesha Ang RN  Outcome: Progressing  Goal: Absence of Infection Signs and Symptoms  10/1/2024 2156 by Ayesha Ang RN  Outcome: Progressing  10/1/2024 2156 by Ayesha Ang RN  Outcome: Progressing  Goal: Acceptable Pain Control  10/1/2024 2156 by Ayesha Ang RN  Outcome: Progressing  10/1/2024 2156 by Ayesha Ang RN  Outcome: Progressing  Goal: Normal Uterine Contraction Pattern  10/1/2024 2156 by Ayesha Ang RN  Outcome: Progressing  10/1/2024 2156 by Ayesha Ang RN  Outcome: Progressing     Problem: Postpartum ( Delivery)  Goal: Successful Parent Role Transition  10/1/2024 2156 by Ayesha Ang RN  Outcome: Progressing  10/1/2024 2156 by Ayesha Ang RN  Outcome: Progressing  Goal: Hemostasis  10/1/2024 2156 by Ayesha Ang RN  Outcome: Progressing  10/1/2024 2156 by yAesha Ang RN  Outcome: Progressing  Goal: Effective Bowel Elimination  10/1/2024 2156 by Ayesha Ang RN  Outcome: Progressing  10/1/2024 2156 by Ayesha Ang RN  Outcome: Progressing  Goal: Fluid and Electrolyte Balance  10/1/2024 2156 by Ayesha Ang RN  Outcome: Progressing  10/1/2024 2156 by Ayesha Ang RN  Outcome: Progressing  Goal: Absence of Infection Signs and Symptoms  10/1/2024 2156 by Ayesha nAg RN  Outcome: Progressing  10/1/2024 2156 by Ayesha Ang RN  Outcome: Progressing  Goal: Anesthesia/Sedation Recovery  10/1/2024 2156 by Ayesha Ang RN  Outcome:  Progressing  10/1/2024 2156 by Ayesha Ang RN  Outcome: Progressing  Goal: Optimal Pain Control and Function  10/1/2024 2156 by Ayesha Ang RN  Outcome: Progressing  10/1/2024 2156 by Ayesha Ang RN  Outcome: Progressing  Goal: Nausea and Vomiting Relief  10/1/2024 2156 by Ayesha Ang RN  Outcome: Progressing  10/1/2024 2156 by Ayesha Ang RN  Outcome: Progressing  Goal: Effective Urinary Elimination  10/1/2024 2156 by Ayesha Ang RN  Outcome: Progressing  10/1/2024 2156 by Ayesha Ang RN  Outcome: Progressing  Goal: Effective Oxygenation and Ventilation  10/1/2024 2156 by Ayesha Ang RN  Outcome: Progressing  10/1/2024 2156 by Ayesha Ang RN  Outcome: Progressing

## 2024-10-02 NOTE — PROGRESS NOTES
PostPartum Progress Note        Subjective:      Postpartum Day #2 after LTCS  .  Patient is without complaints. Lochia decreasing, less than menses.  Pain is well controlled. Patient is ambulating without lightheadedness. Passing flatus. Tolerating regular diet.    Objective:      Temp:  [98.6 °F (37 °C)-99 °F (37.2 °C)] 98.6 °F (37 °C)  Pulse:  [] 88  Resp:  [14-18] 14  SpO2:  [97 %-99 %] 97 %  BP: (108-133)/(67-88) 114/72  No intake or output data in the 24 hours ending 10/02/24 0703    Body mass index is 41.88 kg/m².    General: no acute distress  Abdomen: soft, appropriately tender, LTCS incision c/d/i  Extremities: non-tender, symmetric    Group & Rh   Date Value Ref Range Status   2024 B POS  Final     Recent Results (from the past 2 weeks)   CBC with Differential    Collection Time: 10/01/24  7:26 AM   Result Value Ref Range    WBC 19.67 (H) 4.50 - 11.50 x10(3)/mcL    Hgb 10.0 (L) 12.0 - 16.0 g/dL    Hct 32.0 (L) 37.0 - 47.0 %    Platelet 177 130 - 400 x10(3)/mcL   CBC with Differential    Collection Time: 24 11:39 PM   Result Value Ref Range    WBC 13.61 (H) 4.50 - 11.50 x10(3)/mcL    Hgb 12.0 12.0 - 16.0 g/dL    Hct 37.7 37.0 - 47.0 %    Platelet 247 130 - 400 x10(3)/mcL          Assessment/Plan     29 y.o.  S/P , PPD # 2 - Doing appropriately   -Continue routine postpartum care  -Anticipated d/c POD#3-4    Active Problem List with Overview Notes    Diagnosis Date Noted    - BMI affecting pregnancy in third trimester 2024    - History of anencephaly in prior pregnancy, currently pregnant 2024    - Gestational diabetes mellitus (GDM) in third trimester controlled on oral hypoglycemic drug 2024    -Anxiety/depression affecting pregnancy in third trimester 2024    - Thyroid goiter 2024    BMI 45.0-49.9, adult 2024    - Gastroesophageal reflux disease without esophagitis 2024    - HTN in pregnancy, chronic 2024    Anxiety and  depression 2024    AMEE (obstructive sleep apnea) 2024    Tachycardia 2024    History of  delivery 2024     Desires TOLAC, consented 3/26      - h/o PE complicating pregnancy 2024     Occurred 1w PP last pregnancy  Taking lovenox 60 qd with heme/onc       Lovenox 1mg/kg BID PP      Kenyon Sandoval MD  10/02/2024 10:19 AM

## 2024-10-03 VITALS
TEMPERATURE: 99 F | DIASTOLIC BLOOD PRESSURE: 73 MMHG | OXYGEN SATURATION: 99 % | WEIGHT: 229 LBS | HEART RATE: 91 BPM | HEIGHT: 62 IN | SYSTOLIC BLOOD PRESSURE: 117 MMHG | RESPIRATION RATE: 18 BRPM | BODY MASS INDEX: 42.14 KG/M2

## 2024-10-03 LAB — RBCS: NORMAL

## 2024-10-03 PROCEDURE — 25000003 PHARM REV CODE 250: Performed by: STUDENT IN AN ORGANIZED HEALTH CARE EDUCATION/TRAINING PROGRAM

## 2024-10-03 PROCEDURE — 63600175 PHARM REV CODE 636 W HCPCS: Performed by: STUDENT IN AN ORGANIZED HEALTH CARE EDUCATION/TRAINING PROGRAM

## 2024-10-03 RX ORDER — OXYCODONE HYDROCHLORIDE 5 MG/1
5 TABLET ORAL EVERY 4 HOURS PRN
Qty: 28 TABLET | Refills: 0 | Status: SHIPPED | OUTPATIENT
Start: 2024-10-03 | End: 2024-10-03

## 2024-10-03 RX ORDER — DOCUSATE SODIUM 100 MG/1
100 CAPSULE, LIQUID FILLED ORAL 2 TIMES DAILY
Qty: 60 CAPSULE | Refills: 0 | Status: SHIPPED | OUTPATIENT
Start: 2024-10-03 | End: 2025-10-03

## 2024-10-03 RX ORDER — ENOXAPARIN SODIUM 150 MG/ML
1 INJECTION SUBCUTANEOUS EVERY 12 HOURS
Qty: 56 ML | Refills: 0 | Status: SHIPPED | OUTPATIENT
Start: 2024-10-03 | End: 2024-10-04 | Stop reason: SDUPTHER

## 2024-10-03 RX ORDER — ACETAMINOPHEN 500 MG
1000 TABLET ORAL EVERY 6 HOURS
Qty: 120 TABLET | Refills: 0 | Status: SHIPPED | OUTPATIENT
Start: 2024-10-03 | End: 2024-10-04 | Stop reason: SDUPTHER

## 2024-10-03 RX ORDER — IBUPROFEN 600 MG/1
600 TABLET ORAL EVERY 6 HOURS
Qty: 60 TABLET | Refills: 0 | Status: SHIPPED | OUTPATIENT
Start: 2024-10-03 | End: 2024-10-04 | Stop reason: SDUPTHER

## 2024-10-03 RX ORDER — OXYCODONE HYDROCHLORIDE 5 MG/1
5 TABLET ORAL EVERY 4 HOURS PRN
Qty: 28 TABLET | Refills: 0 | Status: SHIPPED | OUTPATIENT
Start: 2024-10-03 | End: 2024-10-04 | Stop reason: SDUPTHER

## 2024-10-03 RX ADMIN — ENOXAPARIN SODIUM 105 MG: 120 INJECTION SUBCUTANEOUS at 07:10

## 2024-10-03 RX ADMIN — SERTRALINE HYDROCHLORIDE 50 MG: 50 TABLET ORAL at 07:10

## 2024-10-03 RX ADMIN — OXYCODONE AND ACETAMINOPHEN 1 TABLET: 10; 325 TABLET ORAL at 07:10

## 2024-10-03 RX ADMIN — OXYCODONE AND ACETAMINOPHEN 1 TABLET: 10; 325 TABLET ORAL at 03:10

## 2024-10-03 RX ADMIN — IBUPROFEN 800 MG: 800 TABLET, FILM COATED ORAL at 11:10

## 2024-10-03 RX ADMIN — OXYCODONE AND ACETAMINOPHEN 1 TABLET: 10; 325 TABLET ORAL at 01:10

## 2024-10-03 RX ADMIN — DOCUSATE SODIUM 200 MG: 100 CAPSULE, LIQUID FILLED ORAL at 07:10

## 2024-10-03 RX ADMIN — IBUPROFEN 800 MG: 800 TABLET, FILM COATED ORAL at 03:10

## 2024-10-03 RX ADMIN — PRENATAL VITAMINS-IRON FUMARATE 27 MG IRON-FOLIC ACID 0.8 MG TABLET 1 TABLET: at 07:10

## 2024-10-03 NOTE — DISCHARGE SUMMARY
Delivery Discharge Summary  Obstetrics      Primary OB Clinician: Kenyon Sandoval MD    Discharge Provider: Kenyon Sandoval MD    Admission date: 2024  Discharge date: 10/03/2024    Admit Dx:  Patient Active Problem List   Diagnosis    - h/o PE complicating pregnancy    BMI 45.0-49.9, adult    - Gastroesophageal reflux disease without esophagitis    - HTN in pregnancy, chronic    Anxiety and depression    AMEE (obstructive sleep apnea)    Tachycardia    History of  delivery    - BMI affecting pregnancy in third trimester    - History of anencephaly in prior pregnancy, currently pregnant    - Gestational diabetes mellitus (GDM) in third trimester controlled on oral hypoglycemic drug    -Anxiety/depression affecting pregnancy in third trimester    - Thyroid goiter   ]     Discharge Dx:    Same    Procedure:  delivery    Hospital Course:  Rachel Gillespie is a 29 y.o. now  who was admitted on 2024 for delivery. Patient delivered a viable . Please see delivery note for further details. Pt was in stable condition post delivery and was transferred to the Mother-Baby Unit. Her postpartum course was uncomplicated. On the date of discharge, patient's pain is controlled with oral pain medications. She is tolerating ambulation without SOB or CP, and PO diet without N/V. Reported lochia is within the normal range. Pt in stable condition and ready for discharge.     Pertinent studies:  Postpartum CBC  Lab Results   Component Value Date    WBC 19.67 (H) 10/01/2024    HGB 10.0 (L) 10/01/2024    HCT 32.0 (L) 10/01/2024    MCV 85.3 10/01/2024     10/01/2024       Delivery:    Episiotomy: None   Lacerations: None   Repair suture: None   Repair # of packets:     Blood loss (ml):       Birth information:  YOB: 2025   Time of birth: 10:48 PM   Sex: male   Delivery type: , Low Transverse   Gestational Age: 37w6d     Measurements    Weight: 2760 g  Weight (lbs): 6 lb 1.4  "oz  Length: 50.8 cm  Length (in): 20"  Head circumference: 31.8 cm         Delivery Clinician: Delivery Providers    Delivering clinician: Kenyon Sandoval MD   Provider Role    Brian Devlin RN Registered Nurse    Milly No RN Charge Nurse             Additional  information:  Forceps:    Vacuum:    Breech:    Observed anomalies      Living?:     Apgars    Living status: Living  Apgar Component Scores:  1 min.:  5 min.:  10 min.:  15 min.:  20 min.:    Skin color:  0  1       Heart rate:  1  2       Reflex irritability:  0  2       Muscle tone:  0  2       Respiratory effort:  0  2       Total:  1  9       Apgars assigned by: ZOEY PALENCIA RN         Placenta: Delivered:       appearance    Disposition: To home, self care    Follow Up: 1-2 weeks    Patient Instructions:   1. Call the office for any bleeding >2 pads/hour for >2 hours, temperature >100.4, pain that is uncontrolled with medications, or for any other concerns.  2. Pelvic rest and no tub baths x 6 weeks.            " Constipation

## 2024-10-03 NOTE — LACTATION NOTE
This note was copied from a baby's chart.  Met with mom for lactation discharge instructions. Mom has decided that she does not want to latch baby anymore. Said for her mental health, she prefers to bottle feed. Supported moms decision. Offered time for any questions she had. Completed discharge instructions, including education on pumping. Mom is unsure if she will pump at home. Encouraged to call RN for any questions. She voiced understanding.    [facility]   [phone]   Discharge Instructions      Feed baby when you notice early hunger cues, such as rooting, hand to mouth, smacking lips, sticking out tongue.  Crying is a late sign of hunger. Try to get baby to the breast before crying begins. (page 2 & 39 of booklet)      Most newborns need to eat at least 8 times in a 24-hour period. Feeding often will help develop milk supply.  Feed baby anytime hunger cues are noticed, and wake baby if needed to ensure 8 or more feeds per 24 hour period. (pg. 24)      Cluster feeding is normal: baby may nurse very often for several times in a row.  This commonly occurs in the evening or early part of the night. (pg. 4)       Allow your baby to finish one side before offering the other. You can try to burp baby and then offer the other breast if he/she seems to still be hungry.       Skin to skin contact can help a sleepy baby want to nurse. Babies held skin to skin, often nurse better and longer. Skin to skin increases moms milk-making hormone levels as well. Skin to skin is calming for mom and baby. (pg. 23)      In the early days, the number of wet and dirty diapers baby has each day can help you know if baby is getting enough to eat.  Refer to your breastfeeding booklet (pgs. 2-12) to see how many wet/dirty diapers baby should be having each day.  Contact babys pediatrician or lactation, if baby is not having enough wet and dirty diapers.      It is best to avoid pacifiers and bottles for the first 4 weeks,  while getting breastfeeding established.        Back to work or school:  4 weeks is a good time to start pumping after morning feeds, in order to store milk for baby. Although you may pump before if needed. Week 4 is also a good time to introduce a bottle of pumped milk to baby, if you will be going back to work or school.  This can be done sooner if needed. (Refer to pgs 25-27 for pumping and milk storage)       When baby is latched deeply to your breast, you should feel a strong tugging or pulling sensation. If your nipples are sore, you may feel mild discomfort with initial latch that eases quickly.  If there is pain, try to adjust the latch. Make sure your baby opens his mouth wide to latch on. Scan the QR code on page 18 for a video on latching.       Listen for swallowing when baby is nursing. This indicates your baby is transferring that milk!      Your milk will increase between days 3-5.  Frequent feeds can help prevent engorgement.      If your breasts begin to get engorged, refer to pages 20 & 21 for tips on management.  Feed often to help keep your breasts comfortable. If baby is not able to remove milk from your breasts, pumping will be needed to relieve breast fullness and build milk supply. If baby is removing milk well from your breasts, but they are still uncomfortably full after, You may need to pump for comfort, meaning just pump enough to relieve the fullness.      No soap or lotions to the nipples except for medical grade lanolin or nipple cream for soreness.        All babies go through growth spurts. The first one is generally around 2-3 weeks. If your baby starts to nurse a lot more than usual, this is likely the reason.  Growth spurts happen every so often, and usually lasts for 3-5 days.      Remember to check the safety of any medications, prescription or non-prescription, and herbals, before you take them.  Your babys pediatrician is the best one to confirm the safety of the medication  while you are breastfeeding. You may also the lactation department, or the [facility] at [phone], to check the safety of a medication. (pg 31)      Call with any questions or concerns. Dont wait --ask for help early. Breastfeeding Resources can be found on pages 33-35 of your Breastfeeding Booklet given to you in the hospital.

## 2024-10-03 NOTE — PLAN OF CARE
"  Problem: Diabetes in Pregnancy  Goal: Optimal Coping  Outcome: Progressing  Goal: Blood Glucose Level Within Targeted Range  Outcome: Progressing     Problem:  Fall Injury Risk  Goal: Absence of Fall, Infant Drop and Related Injury  Outcome: Progressing     Problem: Infection  Goal: Absence of Infection Signs and Symptoms  Outcome: Progressing     Problem: Adult Inpatient Plan of Care  Goal: Plan of Care Review  Outcome: Progressing  Goal: Patient-Specific Goal (Individualized)  Outcome: Progressing  Flowsheets (Taken 10/2/2024 2122)  Individualized Care Needs: "pain control"  Goal: Absence of Hospital-Acquired Illness or Injury  Outcome: Progressing  Goal: Optimal Comfort and Wellbeing  Outcome: Progressing  Goal: Readiness for Transition of Care  Outcome: Progressing     Problem: Bariatric Environmental Safety  Goal: Safety Maintained with Care  Outcome: Progressing     Problem: Postpartum ( Delivery)  Goal: Successful Parent Role Transition  Outcome: Progressing  Goal: Hemostasis  Outcome: Progressing  Goal: Effective Bowel Elimination  Outcome: Progressing  Goal: Fluid and Electrolyte Balance  Outcome: Progressing  Goal: Absence of Infection Signs and Symptoms  Outcome: Progressing  Goal: Anesthesia/Sedation Recovery  Outcome: Progressing  Goal: Optimal Pain Control and Function  Outcome: Progressing  Goal: Nausea and Vomiting Relief  Outcome: Progressing  Goal: Effective Urinary Elimination  Outcome: Progressing  Goal: Effective Oxygenation and Ventilation  Outcome: Progressing     "

## 2024-10-03 NOTE — PLAN OF CARE
Problem: Diabetes in Pregnancy  Goal: Optimal Coping  Outcome: Progressing  Goal: Blood Glucose Level Within Targeted Range  Outcome: Progressing     Problem:  Fall Injury Risk  Goal: Absence of Fall, Infant Drop and Related Injury  Outcome: Progressing     Problem: Infection  Goal: Absence of Infection Signs and Symptoms  Outcome: Progressing     Problem: Adult Inpatient Plan of Care  Goal: Plan of Care Review  Outcome: Progressing  Goal: Patient-Specific Goal (Individualized)  Outcome: Progressing  Goal: Absence of Hospital-Acquired Illness or Injury  Outcome: Progressing  Goal: Optimal Comfort and Wellbeing  Outcome: Progressing  Goal: Readiness for Transition of Care  Outcome: Progressing     Problem: Bariatric Environmental Safety  Goal: Safety Maintained with Care  Outcome: Progressing     Problem: Postpartum ( Delivery)  Goal: Successful Parent Role Transition  Outcome: Progressing  Goal: Hemostasis  Outcome: Progressing  Goal: Effective Bowel Elimination  Outcome: Progressing  Goal: Fluid and Electrolyte Balance  Outcome: Progressing  Goal: Absence of Infection Signs and Symptoms  Outcome: Progressing  Goal: Anesthesia/Sedation Recovery  Outcome: Progressing  Goal: Optimal Pain Control and Function  Outcome: Progressing  Goal: Nausea and Vomiting Relief  Outcome: Progressing  Goal: Effective Urinary Elimination  Outcome: Progressing  Goal: Effective Oxygenation and Ventilation  Outcome: Progressing     Problem: Wound  Goal: Optimal Coping  Outcome: Progressing  Goal: Optimal Functional Ability  Outcome: Progressing  Goal: Absence of Infection Signs and Symptoms  Outcome: Progressing  Goal: Improved Oral Intake  Outcome: Progressing  Goal: Optimal Pain Control and Function  Outcome: Progressing  Goal: Skin Health and Integrity  Outcome: Progressing  Goal: Optimal Wound Healing  Outcome: Progressing

## 2024-10-04 ENCOUNTER — TELEPHONE (OUTPATIENT)
Dept: OBSTETRICS AND GYNECOLOGY | Facility: HOSPITAL | Age: 29
End: 2024-10-04
Payer: COMMERCIAL

## 2024-10-05 ENCOUNTER — PATIENT MESSAGE (OUTPATIENT)
Dept: ADMINISTRATIVE | Facility: OTHER | Age: 29
End: 2024-10-05
Payer: COMMERCIAL

## 2024-10-06 ENCOUNTER — PATIENT MESSAGE (OUTPATIENT)
Dept: ADMINISTRATIVE | Facility: OTHER | Age: 29
End: 2024-10-06
Payer: COMMERCIAL

## 2024-10-07 ENCOUNTER — PATIENT MESSAGE (OUTPATIENT)
Dept: ADMINISTRATIVE | Facility: CLINIC | Age: 29
End: 2024-10-07
Payer: COMMERCIAL

## (undated) DEVICE — BULB SYRINGE EAR IRRIGATION

## (undated) DEVICE — SEE MEDLINE ITEM 157117

## (undated) DEVICE — ELECTRODE REM PLYHSV RETURN 9

## (undated) DEVICE — SOL NACL IRR 1000ML BTL

## (undated) DEVICE — POWDER ARISTA AH 3G

## (undated) DEVICE — SUT 2/0 27IN PLAIN GUT CT

## (undated) DEVICE — SOL WATER STRL IRR 1000ML

## (undated) DEVICE — TRAY CATH FOL SIL URIMTR 16FR

## (undated) DEVICE — PAD SANITARY OB STERILE

## (undated) DEVICE — SUT MONOCRYL 4-0 PS-1 UND

## (undated) DEVICE — STAPLER SKIN SUBCUTICULAR

## (undated) DEVICE — BINDER ABDOM 4PANEL 12IN LG/XL

## (undated) DEVICE — SUT CTD VICRYL 0 UND BR CT

## (undated) DEVICE — PAD UNDERPAD 30X30

## (undated) DEVICE — ADHESIVE DERMABOND ADVANCED

## (undated) DEVICE — SEE MEDLINE ITEM 156931

## (undated) DEVICE — CAP BABY BEANIE

## (undated) DEVICE — SUT 2-0 VICRYL / CT-1